# Patient Record
Sex: MALE | Race: WHITE | Employment: FULL TIME | URBAN - METROPOLITAN AREA
[De-identification: names, ages, dates, MRNs, and addresses within clinical notes are randomized per-mention and may not be internally consistent; named-entity substitution may affect disease eponyms.]

---

## 2020-01-26 ENCOUNTER — APPOINTMENT (OUTPATIENT)
Dept: GENERAL RADIOLOGY | Age: 45
DRG: 201 | End: 2020-01-26
Payer: COMMERCIAL

## 2020-01-26 ENCOUNTER — HOSPITAL ENCOUNTER (INPATIENT)
Age: 45
LOS: 3 days | Discharge: HOME | DRG: 201 | End: 2020-01-29
Attending: EMERGENCY MEDICINE | Admitting: HOSPITALIST
Payer: COMMERCIAL

## 2020-01-26 PROBLEM — J93.9 PNEUMOTHORAX: Status: ACTIVE | Noted: 2020-01-26

## 2020-01-26 LAB
ANION GAP SERPL CALCULATED.3IONS-SCNC: 17 MMOL/L (ref 3–16)
BASOPHILS ABSOLUTE: 0 K/UL (ref 0–0.2)
BASOPHILS RELATIVE PERCENT: 0.2 %
BUN BLDV-MCNC: 9 MG/DL (ref 7–20)
CALCIUM SERPL-MCNC: 10.1 MG/DL (ref 8.3–10.6)
CHLORIDE BLD-SCNC: 96 MMOL/L (ref 99–110)
CO2: 25 MMOL/L (ref 21–32)
CREAT SERPL-MCNC: 0.6 MG/DL (ref 0.9–1.3)
EKG ATRIAL RATE: 97 BPM
EKG DIAGNOSIS: NORMAL
EKG P AXIS: 42 DEGREES
EKG P-R INTERVAL: 128 MS
EKG Q-T INTERVAL: 468 MS
EKG QRS DURATION: 98 MS
EKG QTC CALCULATION (BAZETT): 594 MS
EKG R AXIS: 32 DEGREES
EKG T AXIS: 59 DEGREES
EKG VENTRICULAR RATE: 97 BPM
EOSINOPHILS ABSOLUTE: 0 K/UL (ref 0–0.6)
EOSINOPHILS RELATIVE PERCENT: 0.1 %
GFR AFRICAN AMERICAN: >60
GFR NON-AFRICAN AMERICAN: >60
GLUCOSE BLD-MCNC: 105 MG/DL (ref 70–99)
HCT VFR BLD CALC: 50.4 % (ref 40.5–52.5)
HEMOGLOBIN: 16.6 G/DL (ref 13.5–17.5)
LYMPHOCYTES ABSOLUTE: 1.3 K/UL (ref 1–5.1)
LYMPHOCYTES RELATIVE PERCENT: 9.6 %
MCH RBC QN AUTO: 29.6 PG (ref 26–34)
MCHC RBC AUTO-ENTMCNC: 32.9 G/DL (ref 31–36)
MCV RBC AUTO: 89.7 FL (ref 80–100)
MONOCYTES ABSOLUTE: 0.9 K/UL (ref 0–1.3)
MONOCYTES RELATIVE PERCENT: 6.3 %
NEUTROPHILS ABSOLUTE: 11.7 K/UL (ref 1.7–7.7)
NEUTROPHILS RELATIVE PERCENT: 83.8 %
PDW BLD-RTO: 13.9 % (ref 12.4–15.4)
PLATELET # BLD: 236 K/UL (ref 135–450)
PMV BLD AUTO: 8.6 FL (ref 5–10.5)
POTASSIUM REFLEX MAGNESIUM: 4.2 MMOL/L (ref 3.5–5.1)
PRO-BNP: 77 PG/ML (ref 0–124)
RBC # BLD: 5.62 M/UL (ref 4.2–5.9)
SODIUM BLD-SCNC: 138 MMOL/L (ref 136–145)
TROPONIN: <0.01 NG/ML
WBC # BLD: 13.9 K/UL (ref 4–11)

## 2020-01-26 PROCEDURE — 6370000000 HC RX 637 (ALT 250 FOR IP): Performed by: EMERGENCY MEDICINE

## 2020-01-26 PROCEDURE — 93005 ELECTROCARDIOGRAM TRACING: CPT | Performed by: EMERGENCY MEDICINE

## 2020-01-26 PROCEDURE — 94640 AIRWAY INHALATION TREATMENT: CPT

## 2020-01-26 PROCEDURE — 99255 IP/OBS CONSLTJ NEW/EST HI 80: CPT | Performed by: THORACIC SURGERY (CARDIOTHORACIC VASCULAR SURGERY)

## 2020-01-26 PROCEDURE — 96374 THER/PROPH/DIAG INJ IV PUSH: CPT

## 2020-01-26 PROCEDURE — 2060000000 HC ICU INTERMEDIATE R&B

## 2020-01-26 PROCEDURE — 96375 TX/PRO/DX INJ NEW DRUG ADDON: CPT

## 2020-01-26 PROCEDURE — 2580000003 HC RX 258: Performed by: EMERGENCY MEDICINE

## 2020-01-26 PROCEDURE — 0W9930Z DRAINAGE OF RIGHT PLEURAL CAVITY WITH DRAINAGE DEVICE, PERCUTANEOUS APPROACH: ICD-10-PCS | Performed by: EMERGENCY MEDICINE

## 2020-01-26 PROCEDURE — 85025 COMPLETE CBC W/AUTO DIFF WBC: CPT

## 2020-01-26 PROCEDURE — 94761 N-INVAS EAR/PLS OXIMETRY MLT: CPT

## 2020-01-26 PROCEDURE — 80048 BASIC METABOLIC PNL TOTAL CA: CPT

## 2020-01-26 PROCEDURE — 99285 EMERGENCY DEPT VISIT HI MDM: CPT

## 2020-01-26 PROCEDURE — 6360000002 HC RX W HCPCS: Performed by: EMERGENCY MEDICINE

## 2020-01-26 PROCEDURE — 96376 TX/PRO/DX INJ SAME DRUG ADON: CPT

## 2020-01-26 PROCEDURE — 2500000003 HC RX 250 WO HCPCS: Performed by: EMERGENCY MEDICINE

## 2020-01-26 PROCEDURE — 2580000003 HC RX 258: Performed by: HOSPITALIST

## 2020-01-26 PROCEDURE — 2700000000 HC OXYGEN THERAPY PER DAY

## 2020-01-26 PROCEDURE — 93010 ELECTROCARDIOGRAM REPORT: CPT | Performed by: INTERNAL MEDICINE

## 2020-01-26 PROCEDURE — 71045 X-RAY EXAM CHEST 1 VIEW: CPT

## 2020-01-26 PROCEDURE — 31500 INSERT EMERGENCY AIRWAY: CPT

## 2020-01-26 PROCEDURE — 83880 ASSAY OF NATRIURETIC PEPTIDE: CPT

## 2020-01-26 PROCEDURE — 84484 ASSAY OF TROPONIN QUANT: CPT

## 2020-01-26 RX ORDER — LORAZEPAM 2 MG/ML
1 INJECTION INTRAMUSCULAR ONCE
Status: COMPLETED | OUTPATIENT
Start: 2020-01-26 | End: 2020-01-26

## 2020-01-26 RX ORDER — SODIUM CHLORIDE 9 MG/ML
1000 INJECTION, SOLUTION INTRAVENOUS CONTINUOUS
Status: DISCONTINUED | OUTPATIENT
Start: 2020-01-26 | End: 2020-01-27

## 2020-01-26 RX ORDER — IPRATROPIUM BROMIDE AND ALBUTEROL SULFATE 2.5; .5 MG/3ML; MG/3ML
1 SOLUTION RESPIRATORY (INHALATION) ONCE
Status: COMPLETED | OUTPATIENT
Start: 2020-01-26 | End: 2020-01-26

## 2020-01-26 RX ORDER — OXYCODONE HYDROCHLORIDE AND ACETAMINOPHEN 5; 325 MG/1; MG/1
2 TABLET ORAL EVERY 4 HOURS PRN
Status: DISCONTINUED | OUTPATIENT
Start: 2020-01-26 | End: 2020-01-29 | Stop reason: HOSPADM

## 2020-01-26 RX ORDER — OXYCODONE HYDROCHLORIDE AND ACETAMINOPHEN 5; 325 MG/1; MG/1
1 TABLET ORAL EVERY 4 HOURS PRN
Status: DISCONTINUED | OUTPATIENT
Start: 2020-01-26 | End: 2020-01-29 | Stop reason: HOSPADM

## 2020-01-26 RX ORDER — LIDOCAINE HYDROCHLORIDE AND EPINEPHRINE 10; 10 MG/ML; UG/ML
20 INJECTION, SOLUTION INFILTRATION; PERINEURAL ONCE
Status: COMPLETED | OUTPATIENT
Start: 2020-01-26 | End: 2020-01-26

## 2020-01-26 RX ORDER — SODIUM CHLORIDE 9 MG/ML
1000 INJECTION, SOLUTION INTRAVENOUS CONTINUOUS
Status: DISCONTINUED | OUTPATIENT
Start: 2020-01-26 | End: 2020-01-26

## 2020-01-26 RX ADMIN — IPRATROPIUM BROMIDE AND ALBUTEROL SULFATE 1 AMPULE: .5; 3 SOLUTION RESPIRATORY (INHALATION) at 13:29

## 2020-01-26 RX ADMIN — LORAZEPAM 1 MG: 2 INJECTION INTRAMUSCULAR; INTRAVENOUS at 15:05

## 2020-01-26 RX ADMIN — HYDROMORPHONE HYDROCHLORIDE 1 MG: 1 INJECTION, SOLUTION INTRAMUSCULAR; INTRAVENOUS; SUBCUTANEOUS at 14:20

## 2020-01-26 RX ADMIN — IPRATROPIUM BROMIDE AND ALBUTEROL SULFATE 1 AMPULE: .5; 3 SOLUTION RESPIRATORY (INHALATION) at 16:08

## 2020-01-26 RX ADMIN — HYDROMORPHONE HYDROCHLORIDE 1 MG: 1 INJECTION, SOLUTION INTRAMUSCULAR; INTRAVENOUS; SUBCUTANEOUS at 15:10

## 2020-01-26 RX ADMIN — LIDOCAINE HYDROCHLORIDE AND EPINEPHRINE 20 ML: 10; 10 INJECTION, SOLUTION INFILTRATION; PERINEURAL at 15:15

## 2020-01-26 RX ADMIN — SODIUM CHLORIDE 1000 ML: 9 INJECTION, SOLUTION INTRAVENOUS at 13:45

## 2020-01-26 RX ADMIN — SODIUM CHLORIDE 1000 ML: 9 INJECTION, SOLUTION INTRAVENOUS at 18:53

## 2020-01-26 ASSESSMENT — PAIN DESCRIPTION - LOCATION
LOCATION: CHEST
LOCATION: CHEST

## 2020-01-26 ASSESSMENT — ENCOUNTER SYMPTOMS
ABDOMINAL PAIN: 0
VOMITING: 0
COUGH: 1
SHORTNESS OF BREATH: 1
NAUSEA: 0
CHEST TIGHTNESS: 0

## 2020-01-26 ASSESSMENT — PAIN DESCRIPTION - PROGRESSION
CLINICAL_PROGRESSION: GRADUALLY IMPROVING
CLINICAL_PROGRESSION: GRADUALLY WORSENING
CLINICAL_PROGRESSION: GRADUALLY IMPROVING

## 2020-01-26 ASSESSMENT — PAIN DESCRIPTION - FREQUENCY
FREQUENCY: CONTINUOUS
FREQUENCY: CONTINUOUS

## 2020-01-26 ASSESSMENT — PAIN DESCRIPTION - PAIN TYPE
TYPE: ACUTE PAIN
TYPE: ACUTE PAIN

## 2020-01-26 ASSESSMENT — PAIN SCALES - GENERAL
PAINLEVEL_OUTOF10: 10
PAINLEVEL_OUTOF10: 4
PAINLEVEL_OUTOF10: 10
PAINLEVEL_OUTOF10: 0
PAINLEVEL_OUTOF10: 10
PAINLEVEL_OUTOF10: 10

## 2020-01-26 ASSESSMENT — PAIN DESCRIPTION - DESCRIPTORS: DESCRIPTORS: DULL

## 2020-01-26 ASSESSMENT — PAIN DESCRIPTION - ORIENTATION
ORIENTATION: RIGHT
ORIENTATION: RIGHT

## 2020-01-26 ASSESSMENT — PAIN - FUNCTIONAL ASSESSMENT
PAIN_FUNCTIONAL_ASSESSMENT: PREVENTS OR INTERFERES SOME ACTIVE ACTIVITIES AND ADLS
PAIN_FUNCTIONAL_ASSESSMENT: PREVENTS OR INTERFERES WITH MANY ACTIVE NOT PASSIVE ACTIVITIES

## 2020-01-26 ASSESSMENT — PAIN DESCRIPTION - ONSET: ONSET: ON-GOING

## 2020-01-26 NOTE — ED PROVIDER NOTES
Tenderness: There is no abdominal tenderness. Hernia: No hernia is present. Skin:     General: Skin is warm and dry. Neurological:      Mental Status: He is alert and oriented to person, place, and time. Psychiatric:         Behavior: Behavior normal. Behavior is cooperative. Thought Content: Thought content normal.         DIAGNOSTIC RESULTS   LABS:    Labs Reviewed   CBC WITH AUTO DIFFERENTIAL - Abnormal; Notable for the following components:       Result Value    WBC 13.9 (*)     Neutrophils Absolute 11.7 (*)     All other components within normal limits    Narrative:     Performed at:  [facility]  [address] [place], [place] [address]   Phone [phone]   BASIC METABOLIC PANEL W/ REFLEX TO MG FOR LOW K - Abnormal; Notable for the following components:    Chloride 96 (*)     Anion Gap 17 (*)     Glucose 105 (*)     CREATININE 0.6 (*)     All other components within normal limits    Narrative:     Performed at:  [address] [address]   Phone [phone]   BRAIN NATRIURETIC PEPTIDE    Narrative:     Performed at:  [address] [address]   Phone [phone]   TROPONIN    Narrative:     Performed at:  [facility]  [facility] [place] [address] [place], [place] [address]   Phone [phone]       All other labs were within normal range or not returned as of this dictation. EKG: All EKG's are interpreted by the Emergency Department Physician in the absence of a cardiologist.  Please see their note for interpretation of EKG.       RADIOLOGY:   Non-plain film images such as CT, Ultrasound and MRI are read by the radiologist. Plain radiographic images are visualized and preliminarily interpreted by the  ED Provider with the below findings:    Interpretation per the Radiologist below, if available at the time of this note:    XR CHEST PORTABLE   Final Result   Large acute spontaneous right-sided pneumothorax. No finding of tension. No results found. PROCEDURES   Unless otherwise noted below, none     Procedures    CRITICAL CARE TIME   N/A    CONSULTS:  IP CONSULT TO [address] [place] [address] COURSE and DIFFERENTIAL DIAGNOSIS/MDM:   Vitals:    Vitals:    01/26/20 1225 01/26/20 1332 01/26/20 1346 01/26/20 1401   BP: (!) 141/88      Pulse: 83      Resp: 21 20 20    Temp: 98.4 °F (36.9 °C)      TempSrc: Oral      SpO2: 93% 94% 95% 96%   Weight: 207 lb 7.3 oz (94.1 kg)      Height: 6' 1\" (1.854 m)          Patient was given thefollowing medications:  Medications   0.9 % sodium chloride infusion (has no administration in time range)   lidocaine-EPINEPHrine 1 percent-1:[id number] injection 20 mL (has no administration in time range)   LORazepam (ATIVAN) injection 1 mg (has no administration in time range)   HYDROmorphone (DILAUDID) injection 1 mg (has no administration in time range)   ipratropium-albuterol (DUONEB) nebulizer solution 1 ampule (1 ampule Inhalation Given 1/26/20 1329)       ED COURSE & MEDICAL DECISION MAKING    Pertinent Labs & Imaging studies reviewed. (See chart for details)   -  Patient seen and evaluated in the emergency department. -  Triage and nursing notes reviewed and incorporated. -  Old chart records reviewed and incorporated. -  Patient case discussed with attending physician, Dr Shamir Denson. They saw and examined patient. -  Differential diagnosis includes: acute coronary syndrome, pulmonary embolism, COPD/asthma, pneumonia, sepsis, pericardial tamponade, pneumothorax, CHF, thoracic aortic dissection, anxiety  -  Work-up included:  See above  -  ED treatment included:  Chest tube placement by Dr Lima Mccabe  - Consults: Cardiothoracic surgery  -  Results discussed with patient.   Labs show leukocytosis of 13.9, no concerning anemia, there are no

## 2020-01-27 PROCEDURE — 2060000000 HC ICU INTERMEDIATE R&B

## 2020-01-27 PROCEDURE — 6370000000 HC RX 637 (ALT 250 FOR IP): Performed by: NURSE PRACTITIONER

## 2020-01-27 RX ADMIN — OXYCODONE HYDROCHLORIDE AND ACETAMINOPHEN 2 TABLET: 5; 325 TABLET ORAL at 06:06

## 2020-01-27 RX ADMIN — OXYCODONE HYDROCHLORIDE AND ACETAMINOPHEN 2 TABLET: 5; 325 TABLET ORAL at 20:51

## 2020-01-27 RX ADMIN — OXYCODONE HYDROCHLORIDE AND ACETAMINOPHEN 2 TABLET: 5; 325 TABLET ORAL at 15:03

## 2020-01-27 RX ADMIN — OXYCODONE HYDROCHLORIDE AND ACETAMINOPHEN 2 TABLET: 5; 325 TABLET ORAL at 01:31

## 2020-01-27 RX ADMIN — OXYCODONE HYDROCHLORIDE AND ACETAMINOPHEN 2 TABLET: 5; 325 TABLET ORAL at 09:52

## 2020-01-27 ASSESSMENT — PAIN DESCRIPTION - PROGRESSION
CLINICAL_PROGRESSION: GRADUALLY WORSENING
CLINICAL_PROGRESSION: NOT CHANGED
CLINICAL_PROGRESSION: GRADUALLY WORSENING

## 2020-01-27 ASSESSMENT — PAIN DESCRIPTION - FREQUENCY
FREQUENCY: CONTINUOUS
FREQUENCY: CONTINUOUS
FREQUENCY: INTERMITTENT
FREQUENCY: CONTINUOUS
FREQUENCY: CONTINUOUS
FREQUENCY: INTERMITTENT

## 2020-01-27 ASSESSMENT — PAIN SCALES - GENERAL
PAINLEVEL_OUTOF10: 0
PAINLEVEL_OUTOF10: 3
PAINLEVEL_OUTOF10: 10
PAINLEVEL_OUTOF10: 7
PAINLEVEL_OUTOF10: 8
PAINLEVEL_OUTOF10: 7
PAINLEVEL_OUTOF10: 7
PAINLEVEL_OUTOF10: 3
PAINLEVEL_OUTOF10: 4

## 2020-01-27 ASSESSMENT — PAIN DESCRIPTION - PAIN TYPE
TYPE: ACUTE PAIN

## 2020-01-27 ASSESSMENT — PAIN DESCRIPTION - ORIENTATION
ORIENTATION: RIGHT

## 2020-01-27 ASSESSMENT — PAIN DESCRIPTION - ONSET
ONSET: ON-GOING
ONSET: AWAKENED FROM SLEEP
ONSET: ON-GOING
ONSET: ON-GOING
ONSET: AWAKENED FROM SLEEP
ONSET: ON-GOING

## 2020-01-27 ASSESSMENT — PAIN DESCRIPTION - DESCRIPTORS
DESCRIPTORS: DULL
DESCRIPTORS: DISCOMFORT
DESCRIPTORS: DULL

## 2020-01-27 ASSESSMENT — PAIN DESCRIPTION - LOCATION
LOCATION: CHEST

## 2020-01-27 ASSESSMENT — PAIN - FUNCTIONAL ASSESSMENT
PAIN_FUNCTIONAL_ASSESSMENT: PREVENTS OR INTERFERES SOME ACTIVE ACTIVITIES AND ADLS
PAIN_FUNCTIONAL_ASSESSMENT: ACTIVITIES ARE NOT PREVENTED
PAIN_FUNCTIONAL_ASSESSMENT: ACTIVITIES ARE NOT PREVENTED
PAIN_FUNCTIONAL_ASSESSMENT: PREVENTS OR INTERFERES SOME ACTIVE ACTIVITIES AND ADLS
PAIN_FUNCTIONAL_ASSESSMENT: ACTIVITIES ARE NOT PREVENTED
PAIN_FUNCTIONAL_ASSESSMENT: PREVENTS OR INTERFERES SOME ACTIVE ACTIVITIES AND ADLS

## 2020-01-27 NOTE — CONSULTS
Consultation H&P    Date of Admission:  1/26/2020 12:20 PM  Date of Consultation:  1/27/2020    PCP:  Jesus Ignacio MD        Chief Complaint: ptx    History of Present Illness: We are asked to see this patient in consultation by Dr. Lima Mccabe regarding ptx. Alexis Russo is a 40 y.o. male smoker who presented to urgent care 1/26/20 - dyspnea, began 3-4 days ago with cough. Worse with exertion. R ptx noted. Pt sent to [facility] THE ED. Chest tube placed. To suction. Admitted. Past Medical History:  History reviewed. No pertinent past medical history. Past Surgical History:  History reviewed. No pertinent surgical history. Home Medications:   Prior to Admission medications    Not on File        Facility Administered Medications: Allergies:  No Known Allergies     Social History:    Working: behavioral health    Social History     Socioeconomic History    Marital status:      Spouse name: Not on file    Number of children: Not on file    Years of education: Not on file    Highest education level: Not on file   Occupational History    Not on file   Social Needs    Financial resource strain: Not on file    Food insecurity:     Worry: Not on file     Inability: Not on file    Transportation needs:     Medical: Not on file     Non-medical: Not on file   Tobacco Use    Smoking status: Former Smoker    Smokeless tobacco: Former User    Tobacco comment: started late teens   Substance and Sexual Activity    Alcohol use:  Yes    Drug use: Not Currently     Types: Opiates      Comment: past     Sexual activity: Never   Lifestyle    Physical activity:     Days per week: Not on file     Minutes per session: Not on file    Stress: Not on file   Relationships    Social connections:     Talks on phone: Not on file     Gets together: Not on file     Attends Jew service: Not on file     Active member of club or organization: Not on file     Attends meetings of clubs or organizations: Not on file     Relationship status: Not on file    Intimate partner violence:     Fear of current or ex partner: Not on file     Emotionally abused: Not on file     Physically abused: Not on file     Forced sexual activity: Not on file   Other Topics Concern    Not on file   Social History Narrative    Not on file       Family History:      Problem Relation Age of Onset    Cancer Father         pancreatic       Review of Systems:  Reviewed, negative unless noted  Constitutional: weight change, weakness, impairment of ADLs  Eyes: eyestrain, redness, discharges  ENMT: post nasal drip, sinus pain, discharge   Cardiovascular: faintness, vertigo, color changes in fingers/toes  Respiratory: cough, sputum, hemoptysis  GI: excessive thirst, changes in bowel habits, abdominal swelling  : painful urination, pyuria, incontinence  Musculoskeletal: cramps, swelling, limitation of motor activity  Integumentary: cyanosis, changes in skin, dryness  Neurological: paralysis, tingling, tremors  Psychiatric: restlessness, irritability, mood swings  Endocrine: heat/cold intolerance, excessive sweating, hair loss  Hematologic/lymphatic: swollen glands, anemia, easy bruising/bleeding      Physical Examination:    /71   Pulse 71   Temp 97.9 °F (36.6 °C) (Oral)   Resp 18   Ht 6' 1\" (1.854 m)   Wt 205 lb 7.5 oz (93.2 kg)   SpO2 97%   BMI 27.11 kg/m²      Admission Weight: 207 lb 7.3 oz (94.1 kg)     General appearance: NAD, well nourished  Eyes: anicteric  ENMT: no scars or lesions, no nasal deformity, normal dentition, no cyanosis of oral mucosa  Neck: no masses, no thyroid enlargement, no JVD. Respiratory: effort is unlabored, symmetric, no crackles, wheezes or rubs. No palpable/percussable abnormalities. Cardiovascular: regular, no murmur. PMI normal, no thrill. No  edema or varicosities.  Abdominal aorta cannot be appreciated   Pulses:    carotid brachial radial femoral popliteal DP PT   RIGHT   2+ LEFT   2+       GI: abdomen soft, nondistended, no organomegaly. Lymphatic: no cervical/supraclavicular adenopathy  Musculoskeletal: strength and tone normal. Full ROM. No scoliosis. Extremities: warm and pink. No clubbing or petechiae. Skin: no dermatitis or ulceration. No nodularity or induration. Neuro: CN grossly intact. Sensation and motor function grossly intact. Psychiatric: oriented, appropriate mood/affect. MEDICAL DECISION MAKING/TESTING  Studies personally reviewed. CXR: 1/26/20  Heart size is normal.  The left lung is clear.  A large spontaneous   pneumothorax is present on the right.  Some tethering of the upper right lung   to the pleural surface is present.  Moderate atelectasis of the lower right   lung present.  No leftward displacement of the mediastinum.  No evident rib   fracture. Heart size is normal.  A small right-sided chest tube is been placed   inferiorly in the right chest with complete or near complete re-expansion at   of the right lung.  No definite pleural edge identified.  Mild-to-moderate   atelectasis present medially in the right lung base.  Lungs otherwise clear. Labs:   CBC:   Recent Labs     01/26/20  1345   WBC 13.9*   HGB 16.6   HCT 50.4   MCV 89.7        BMP:   Recent Labs     01/26/20  1345      K 4.2   CL 96*   CO2 25   BUN 9   CREATININE 0.6*   CALCIUM 10.1     Cardiac Enzymes:   Recent Labs     01/26/20  1345   TROPONINI <0.01     PT/INR: No results for input(s): PROTIME, INR in the last 72 hours. APTT: No results for input(s): APTT in the last 72 hours.   Liver Profile:  No results found for: AST, ALT, BILIDIR, BILITOT, ALKPHOS, LABALBU  No results found for: CHOL, HDL, TRIG  HgbA1c:  No results found for: LABA1C  UA: No results found for: NITRITE, COLORU, PHUR, LABCAST, WBCUA, RBCUA, MUCUS, TRICHOMONAS, YEAST, BACTERIA, CLARITYU, SPECGRAV, LEUKOCYTESUR, UROBILINOGEN, BILIRUBINUR, BLOODU, GLUCOSEU, AMORPHOUS    History obtained:

## 2020-01-27 NOTE — PLAN OF CARE
Problem: Pain:  Goal: Pain level will decrease  Description  Pain level will decrease  1/27/2020 1546 by Amol Sandhu RN  Outcome: Ongoing  Note:   Pt has had complaints of pain. He does have pain medication available. Problem: Pain:  Goal: Control of acute pain  Description  Control of acute pain  1/27/2020 1546 by Amol Sandhu RN  Outcome: Ongoing  Note:   Pain/discomfort being managed with PRN analgesics per MD orders. Pt able to express presence and absence of pain and rate pain appropriately using numerical scale. Problem: Pain:  Goal: Control of chronic pain  Description  Control of chronic pain  1/27/2020 1546 by Amol Sandhu RN  Outcome: Ongoing  Note:   Pain is acute not chronic. Problem: Breathing Pattern - Ineffective:  Goal: Ability to achieve and maintain a regular respiratory rate will improve  Description  Ability to achieve and maintain a regular respiratory rate will improve  1/27/2020 1546 by Amol Sandhu RN  Outcome: Ongoing  Note:   Respirations easy even no distress. Lung sounds are diminished throughout. Chest tube remains in place draining yellow fluid.

## 2020-01-28 ENCOUNTER — APPOINTMENT (OUTPATIENT)
Dept: CT IMAGING | Age: 45
DRG: 201 | End: 2020-01-28
Payer: COMMERCIAL

## 2020-01-28 PROCEDURE — 6370000000 HC RX 637 (ALT 250 FOR IP): Performed by: NURSE PRACTITIONER

## 2020-01-28 PROCEDURE — 6360000002 HC RX W HCPCS: Performed by: INTERNAL MEDICINE

## 2020-01-28 PROCEDURE — 99232 SBSQ HOSP IP/OBS MODERATE 35: CPT | Performed by: THORACIC SURGERY (CARDIOTHORACIC VASCULAR SURGERY)

## 2020-01-28 PROCEDURE — 71250 CT THORAX DX C-: CPT

## 2020-01-28 PROCEDURE — 82103 ALPHA-1-ANTITRYPSIN TOTAL: CPT

## 2020-01-28 PROCEDURE — 2060000000 HC ICU INTERMEDIATE R&B

## 2020-01-28 RX ORDER — NICOTINE 21 MG/24HR
1 PATCH, TRANSDERMAL 24 HOURS TRANSDERMAL DAILY
Status: DISCONTINUED | OUTPATIENT
Start: 2020-01-28 | End: 2020-01-29 | Stop reason: HOSPADM

## 2020-01-28 RX ORDER — KETOROLAC TROMETHAMINE 30 MG/ML
30 INJECTION, SOLUTION INTRAMUSCULAR; INTRAVENOUS EVERY 6 HOURS PRN
Status: DISCONTINUED | OUTPATIENT
Start: 2020-01-28 | End: 2020-01-29 | Stop reason: HOSPADM

## 2020-01-28 RX ADMIN — OXYCODONE HYDROCHLORIDE AND ACETAMINOPHEN 2 TABLET: 5; 325 TABLET ORAL at 04:32

## 2020-01-28 RX ADMIN — OXYCODONE HYDROCHLORIDE AND ACETAMINOPHEN 2 TABLET: 5; 325 TABLET ORAL at 15:13

## 2020-01-28 RX ADMIN — OXYCODONE HYDROCHLORIDE AND ACETAMINOPHEN 2 TABLET: 5; 325 TABLET ORAL at 08:43

## 2020-01-28 RX ADMIN — KETOROLAC TROMETHAMINE 30 MG: 30 INJECTION, SOLUTION INTRAMUSCULAR at 10:08

## 2020-01-28 RX ADMIN — OXYCODONE HYDROCHLORIDE AND ACETAMINOPHEN 2 TABLET: 5; 325 TABLET ORAL at 20:33

## 2020-01-28 ASSESSMENT — PAIN DESCRIPTION - PROGRESSION
CLINICAL_PROGRESSION: NOT CHANGED
CLINICAL_PROGRESSION: GRADUALLY WORSENING
CLINICAL_PROGRESSION: NOT CHANGED

## 2020-01-28 ASSESSMENT — PAIN DESCRIPTION - ONSET
ONSET: ON-GOING

## 2020-01-28 ASSESSMENT — PAIN - FUNCTIONAL ASSESSMENT
PAIN_FUNCTIONAL_ASSESSMENT: PREVENTS OR INTERFERES SOME ACTIVE ACTIVITIES AND ADLS
PAIN_FUNCTIONAL_ASSESSMENT: ACTIVITIES ARE NOT PREVENTED
PAIN_FUNCTIONAL_ASSESSMENT: PREVENTS OR INTERFERES SOME ACTIVE ACTIVITIES AND ADLS

## 2020-01-28 ASSESSMENT — PAIN DESCRIPTION - PAIN TYPE
TYPE: ACUTE PAIN

## 2020-01-28 ASSESSMENT — PAIN DESCRIPTION - DESCRIPTORS
DESCRIPTORS: DISCOMFORT

## 2020-01-28 ASSESSMENT — PAIN DESCRIPTION - ORIENTATION
ORIENTATION: RIGHT

## 2020-01-28 ASSESSMENT — PAIN SCALES - GENERAL
PAINLEVEL_OUTOF10: 4
PAINLEVEL_OUTOF10: 4
PAINLEVEL_OUTOF10: 7
PAINLEVEL_OUTOF10: 8
PAINLEVEL_OUTOF10: 10
PAINLEVEL_OUTOF10: 7
PAINLEVEL_OUTOF10: 0
PAINLEVEL_OUTOF10: 7
PAINLEVEL_OUTOF10: 3
PAINLEVEL_OUTOF10: 0
PAINLEVEL_OUTOF10: 6

## 2020-01-28 ASSESSMENT — PAIN DESCRIPTION - LOCATION
LOCATION: CHEST

## 2020-01-28 ASSESSMENT — PAIN DESCRIPTION - FREQUENCY
FREQUENCY: CONTINUOUS
FREQUENCY: INTERMITTENT
FREQUENCY: INTERMITTENT
FREQUENCY: CONTINUOUS
FREQUENCY: INTERMITTENT

## 2020-01-28 NOTE — PLAN OF CARE
Problem: Pain:  Goal: Pain level will decrease  Description  Pain level will decrease  1/28/2020 0052 by Axel Blandon RN  Outcome: Ongoing  1/27/2020 1546 by Cortez Lincoln RN  Outcome: Ongoing  Note:   Pt has had complaints of pain. He does have pain medication available. Goal: Control of acute pain  Description  Control of acute pain  1/28/2020 0052 by Axel Blandon RN  Outcome: Ongoing  1/27/2020 1546 by Cortez Lincoln RN  Outcome: Ongoing  Note:   Pain/discomfort being managed with PRN analgesics per MD orders. Pt able to express presence and absence of pain and rate pain appropriately using numerical scale. Goal: Control of chronic pain  Description  Control of chronic pain  1/28/2020 0052 by Axel Blandon RN  Outcome: Ongoing  1/27/2020 1546 by Cortez Lincoln RN  Outcome: Ongoing  Note:   Pain is acute not chronic. Problem: Breathing Pattern - Ineffective:  Goal: Ability to achieve and maintain a regular respiratory rate will improve  Description  Ability to achieve and maintain a regular respiratory rate will improve  1/28/2020 0052 by Axel Blandon RN  Outcome: Ongoing  1/27/2020 1546 by Cortez Lincoln RN  Outcome: Ongoing  Note:   Respirations easy even no distress. Lung sounds are diminished throughout. Chest tube remains in place draining yellow fluid.

## 2020-01-29 ENCOUNTER — APPOINTMENT (OUTPATIENT)
Dept: GENERAL RADIOLOGY | Age: 45
DRG: 201 | End: 2020-01-29
Payer: COMMERCIAL

## 2020-01-29 VITALS
HEART RATE: 90 BPM | HEIGHT: 73 IN | OXYGEN SATURATION: 95 % | RESPIRATION RATE: 17 BRPM | TEMPERATURE: 98.1 F | BODY MASS INDEX: 27.29 KG/M2 | SYSTOLIC BLOOD PRESSURE: 139 MMHG | DIASTOLIC BLOOD PRESSURE: 78 MMHG | WEIGHT: 205.91 LBS

## 2020-01-29 PROCEDURE — 6370000000 HC RX 637 (ALT 250 FOR IP): Performed by: NURSE PRACTITIONER

## 2020-01-29 PROCEDURE — 82103 ALPHA-1-ANTITRYPSIN TOTAL: CPT

## 2020-01-29 PROCEDURE — 99231 SBSQ HOSP IP/OBS SF/LOW 25: CPT | Performed by: THORACIC SURGERY (CARDIOTHORACIC VASCULAR SURGERY)

## 2020-01-29 PROCEDURE — 82104 ALPHA-1-ANTITRYPSIN PHENO: CPT

## 2020-01-29 PROCEDURE — 6360000002 HC RX W HCPCS: Performed by: INTERNAL MEDICINE

## 2020-01-29 PROCEDURE — 36415 COLL VENOUS BLD VENIPUNCTURE: CPT

## 2020-01-29 PROCEDURE — 99254 IP/OBS CNSLTJ NEW/EST MOD 60: CPT | Performed by: INTERNAL MEDICINE

## 2020-01-29 PROCEDURE — 71045 X-RAY EXAM CHEST 1 VIEW: CPT

## 2020-01-29 RX ORDER — NICOTINE 21 MG/24HR
1 PATCH, TRANSDERMAL 24 HOURS TRANSDERMAL DAILY
Qty: 30 PATCH | Refills: 0 | Status: SHIPPED | OUTPATIENT
Start: 2020-01-30 | End: 2020-01-29 | Stop reason: HOSPADM

## 2020-01-29 RX ORDER — OXYCODONE HYDROCHLORIDE AND ACETAMINOPHEN 5; 325 MG/1; MG/1
1 TABLET ORAL EVERY 6 HOURS PRN
Qty: 20 TABLET | Refills: 0 | Status: SHIPPED | OUTPATIENT
Start: 2020-01-29 | End: 2020-02-03

## 2020-01-29 RX ORDER — ALBUTEROL SULFATE 90 UG/1
2 AEROSOL, METERED RESPIRATORY (INHALATION) EVERY 6 HOURS PRN
Status: DISCONTINUED | OUTPATIENT
Start: 2020-01-29 | End: 2020-01-29 | Stop reason: HOSPADM

## 2020-01-29 RX ORDER — ALBUTEROL SULFATE 90 UG/1
2 AEROSOL, METERED RESPIRATORY (INHALATION) EVERY 6 HOURS PRN
Qty: 1 INHALER | Refills: 3 | Status: SHIPPED | OUTPATIENT
Start: 2020-01-29 | End: 2021-08-23 | Stop reason: ALTCHOICE

## 2020-01-29 RX ADMIN — OXYCODONE HYDROCHLORIDE AND ACETAMINOPHEN 2 TABLET: 5; 325 TABLET ORAL at 01:14

## 2020-01-29 RX ADMIN — OXYCODONE HYDROCHLORIDE AND ACETAMINOPHEN 2 TABLET: 5; 325 TABLET ORAL at 05:51

## 2020-01-29 RX ADMIN — KETOROLAC TROMETHAMINE 30 MG: 30 INJECTION, SOLUTION INTRAMUSCULAR at 09:22

## 2020-01-29 RX ADMIN — OXYCODONE HYDROCHLORIDE AND ACETAMINOPHEN 2 TABLET: 5; 325 TABLET ORAL at 10:03

## 2020-01-29 ASSESSMENT — PAIN DESCRIPTION - ONSET
ONSET: ON-GOING

## 2020-01-29 ASSESSMENT — PAIN DESCRIPTION - PROGRESSION
CLINICAL_PROGRESSION: GRADUALLY WORSENING

## 2020-01-29 ASSESSMENT — PAIN DESCRIPTION - PAIN TYPE
TYPE: ACUTE PAIN

## 2020-01-29 ASSESSMENT — PAIN DESCRIPTION - DESCRIPTORS
DESCRIPTORS: DISCOMFORT

## 2020-01-29 ASSESSMENT — PAIN DESCRIPTION - ORIENTATION
ORIENTATION: RIGHT

## 2020-01-29 ASSESSMENT — PAIN SCALES - GENERAL
PAINLEVEL_OUTOF10: 2
PAINLEVEL_OUTOF10: 7
PAINLEVEL_OUTOF10: 4
PAINLEVEL_OUTOF10: 4
PAINLEVEL_OUTOF10: 8
PAINLEVEL_OUTOF10: 6
PAINLEVEL_OUTOF10: 4
PAINLEVEL_OUTOF10: 5

## 2020-01-29 ASSESSMENT — PAIN DESCRIPTION - LOCATION
LOCATION: CHEST

## 2020-01-29 ASSESSMENT — PAIN DESCRIPTION - FREQUENCY
FREQUENCY: CONTINUOUS

## 2020-01-29 ASSESSMENT — PAIN - FUNCTIONAL ASSESSMENT
PAIN_FUNCTIONAL_ASSESSMENT: ACTIVITIES ARE NOT PREVENTED

## 2020-01-29 NOTE — CONSULTS
01/1995    Smokeless tobacco: Former User    Tobacco comment: started late teens   Substance and Sexual Activity    Alcohol use: Yes    Drug use: Not Currently     Types: Opiates      Comment: past     Sexual activity: Never   Lifestyle    Physical activity:     Days per week: Not on file     Minutes per session: Not on file    Stress: Not on file   Relationships    Social connections:     Talks on phone: Not on file     Gets together: Not on file     Attends Buddhism service: Not on file     Active member of club or organization: Not on file     Attends meetings of clubs or organizations: Not on file     Relationship status: Not on file    Intimate partner violence:     Fear of current or ex partner: Not on file     Emotionally abused: Not on file     Physically abused: Not on file     Forced sexual activity: Not on file   Other Topics Concern    Not on file   Social History Narrative    Not on file   Smokes 1 pack/day. Has been smoking for 20 years  Works as a      reports that he has quit smoking. He started smoking about 25 years ago. He has a 24.00 pack-year smoking history. He has quit using smokeless tobacco.    REVIEW OF SYSTEMS:  Constitutional: Negative for fever  HENT: Negative for sore throat  Eyes: Negative for redness   Respiratory: + for dyspnea, +cough  Cardiovascular: Negative for chest pain  Gastrointestinal: Negative for vomiting, diarrhea   Genitourinary: Negative for hematuria   Musculoskeletal: Negative for arthralgias   Skin: Negative for rash  Neurological: Negative for syncope  Hematological: Negative for adenopathy  Psychiatric/Behavorial: Negative for anxiety    Objective:   PHYSICAL EXAM:  Blood pressure 127/78, pulse 66, temperature 97.6 °F (36.4 °C), temperature source Oral, resp. rate 17, height 6' 1\" (1.854 m), weight 205 lb 14.6 oz (93.4 kg), SpO2 95 %. on RA    Gen: Well developed; well nourished  Eyes: No scleral icterus. No conjunctival injection.    ENT: outpatient PFTs    Lung nodules  -He has small bilateral lung nodules for which he will need a repeat chest CT in 1 year. Discussed with patient    Tobacco use  -We discussed the critical importance of smoking cessation and being away from secondhand smoke in order to prevent progression of his lung disease    Thank you for allowing me to participate in the care of this patient. Will sign off. Please call with any questions or concerns. He should come for pulmonary follow-up 2/13/2020 at 3:20 PM with repeat chest x-ray.     Fred Colon MD  [facility]

## 2020-01-29 NOTE — DISCHARGE SUMMARY
Physician Discharge Summary     Patient ID:  Flori Sandoval  [id number]  29 y.o.  1975    Admit date: 1/26/2020    Discharge date and time: No discharge date for patient encounter. Admitting Physician: Jori Brock MD     Discharge Physician: Rajinder Casas MD    Admission Diagnoses: Pneumothorax [J93.9]  Pneumothorax [J93.9]    Discharge Diagnoses: Pneumothorax     Admission Condition: fair    Discharged Condition: good    Indication for Admission: Pneumothorax     Hospital Course:   40y.o. year old male admitted on 1/26/20 for pneumothorax. He reported exertional shortness of breath for about a week. He was found to have a right-sided pneumothorax. Seen by CT surgery and Pulmonary. He underwent chest tube placement. Chest tube was subsequently removed. No air leak and no crepitus noted. Given percocet and albuterol prn. Patient instructed to leave dressing on X 24 hours. Dc'd home in stable condition. Pt will need outp PFTs and Pulm follow up. Referral given.      Consults: pulmonary/intensive care, CT surgery     Discharge Exam:  /78   Pulse 90   Temp 98.1 °F (36.7 °C) (Oral)   Resp 17   Ht 6' 1\" (1.854 m)   Wt 205 lb 14.6 oz (93.4 kg)   SpO2 95%   BMI 27.17 kg/m²     General Appearance:    Alert, cooperative, no distress, appears stated age   Head:    Normocephalic, without obvious abnormality, atraumatic   Eyes:    PERRL, conjunctiva/corneas clear, EOM's intact, fundi     benign, both eyes        Ears:    Normal TM's and external ear canals, both ears   Nose:   Nares normal, septum midline, mucosa normal, no drainage    or sinus tenderness   Throat:   Lips, mucosa, and tongue normal; teeth and gums normal   Neck:   Supple, symmetrical, trachea midline, no adenopathy;        thyroid:  No enlargement/tenderness/nodules; no carotid    bruit or JVD   Back:     Symmetric, no curvature, ROM normal, no CVA tenderness   Lungs:     Clear to auscultation bilaterally, respirations unlabored   Chest wall:    No tenderness or deformity   Heart:    Regular rate and rhythm, S1 and S2 normal, no murmur, rub   or gallop   Abdomen:     Soft, non-tender, bowel sounds active all four quadrants,     no masses, no organomegaly   Genitalia:    Normal male without lesion, discharge or tenderness   Rectal:    Normal tone, normal prostate, no masses or tenderness;    guaiac negative stool   Extremities:   Extremities normal, atraumatic, no cyanosis or edema   Pulses:   2+ and symmetric all extremities   Skin:   Skin color, texture, turgor normal, no rashes or lesions   Lymph nodes:   Cervical, supraclavicular, and axillary nodes normal   Neurologic:   CNII-XII intact. Normal strength, sensation and reflexes       throughout       Disposition: home    In process/preliminary results:  Outstanding Order Results     Date and Time Order Name Status Description    1/29/2020 1032 Alpha-1-Antitrypsin w Phenotype In process     1/28/2020 1830 Alpha-1-Antitrypsin In process           Patient Instructions:   Current Discharge Medication List      START taking these medications    Details   oxyCODONE-acetaminophen (PERCOCET) 5-325 MG per tablet Take 1 tablet by mouth every 6 hours as needed for Pain for up to 5 days. Qty: 20 tablet, Refills: 0    Comments: Reduce doses taken as pain becomes manageable  Associated Diagnoses: Primary spontaneous pneumothorax      albuterol sulfate  (90 Base) MCG/ACT inhaler Inhale 2 puffs into the lungs every 6 hours as needed for Wheezing or Shortness of Breath  Qty: 1 Inhaler, Refills: 3           Activity: activity as tolerated. No heavy lifting, pulling for one week   Diet: regular diet  Wound Care: as directed    Follow-up with PCP, Pulm in 2 weeks.     Signed:  Marci Chavez MD  Time spent: 36 mins    1/29/2020  12:52 PM

## 2020-01-29 NOTE — PROGRESS NOTES
4 Eyes Skin Assessment     The patient is being assess for  Admission    I agree that 2 RN's have performed a thorough Head to Toe Skin Assessment on the patient. ALL assessment sites listed below have been assessed. Areas assessed by both nurses: Niru Crate  [x]   Head, Face, and Ears   [x]   Shoulders, Back, and Chest  [x]   Arms, Elbows, and Hands   [x]   Coccyx, Sacrum, and IschIum  [x]   Legs, Feet, and Heels        Does the Patient have Skin Breakdown?   No         Tevin Prevention initiated:  Yes   Wound Care Orders initiated:  NA      [facility] nurse consulted for Pressure Injury (Stage 3,4, Unstageable, DTI, NWPT, and Complex wounds), New and Established Ostomies:  NA      Nurse 1 eSignature: Electronically signed by Eusebio Gaytan RN on 1/26/20 at 6:57 PM    **SHARE this note so that the co-signing nurse is able to place an eSignature**    Nurse 2 eSignature: Electronically signed by Corby Nayak RN on 1/26/20 at 6:58 PM
Discharge orders acknowledged by RN . Discharge teaching completed with pt and family. AVS reviewed and all questions answered. New prescriptions and current medication regimen reviewed and pt understands schedule. Follow up appointments also reviewed with pt and resources given for discharge. Pt was given written prescription for pain med to be filled and understands schedule. IV removed. Telemonitor removed and returned to [facility]. All other education completed. Required core measures completed. Pt vitals WDL. Pt discharged with all belongings to private residence with family. Pt able to ambulate off of unit. No complications.    Electronically signed by Bisi Flores RN on 1/29/2020 at 1:17 PM
Hospitalist Progress Note  1/27/2020 8:57 AM    PCP: Holley Moise MD    [id number]     Date of Admission: 1/26/2020                                                                                                                     HOSPITAL COURSE    Patient demographics:  The patient  Carmen Castro is a 40 y.o. male     Significant past medical history:   Patient Active Problem List   Diagnosis    Pneumothorax         Presenting symptoms:      Diagnostic workup:      CONSULTS DURING ADMISSION :   IP CONSULT TO CARDIOTHORACIC SURGERY  IP CONSULT TO [facility]      Patient was diagnosed with:        Treatment while inpatient:                                                                                         ----------------------------------------------------------      SUBJECTIVE COMPLAINTS- follow-up for pneumothorax    Diet: DIET GENERAL;      OBJECTIVE:   Patient Active Problem List   Diagnosis    Pneumothorax       Allergies  Patient has no known allergies. Medications    Scheduled Meds:  Continuous Infusions:  PRN Meds:  oxyCODONE-acetaminophen **OR** oxyCODONE-acetaminophen    Vitals   Vitals /wt   Patient Vitals for the past 8 hrs:   BP Temp Temp src Pulse Resp SpO2 Weight   01/27/20 0817 127/72 97.2 °F (36.2 °C) Oral 84 18 94 % --   01/27/20 0435 127/71 97.9 °F (36.6 °C) Oral 71 18 97 % 205 lb 7.5 oz (93.2 kg)   01/27/20 0059 113/70 97.9 °F (36.6 °C) Oral 65 18 98 % --        72HR INTAKE/OUTPUT:      Intake/Output Summary (Last 24 hours) at 1/27/2020 0857  Last data filed at 1/27/2020 0736  Gross per 24 hour   Intake --   Output 1515 ml   Net -1515 ml       Exam:    Gen:   Alert and oriented ×3   Eyes: PERRL. No sclera icterus. No conjunctival injection. ENT: No discharge. Pharynx clear. External appearance of ears and nose normal.  Neck: Trachea midline. No obvious mass. Resp: decreased breath sounds on the right side  CV: Regular rate. Regular rhythm. No murmur or rub. No edema.
Vital signs, assessment, 4 eyes, and admission completed. Chest tube in place to -20 suction  tegaderm placed over dry dressing. Patient oriented to unit and room. Call light and bedside table within reach. All questions answered. Patient resting quietly with no further questions at this time.   Will continue to monitor  Electronically signed by Sloane Samayoa RN on 1/26/2020 at 5:05 PM
student    1/29/2020  11:15 AM     Tube was clamped overnight. No ptx on today's film. Tube removed. Pt d/eda.     Manual MD Kylah  1/29/2020  3:01 PM
resolved hospital problems. *    Patient is a 63-year-old male who presented with shortness of breath with cough. He was found to have a pneumothorax on the right side and sent to the ER. Chest tube was placed. Pneumothorax- possible chest tube removal tomorrow  -Cardiothoracic consulted. Patient status post chest tube placement  -Repeat chest x-ray shows improvement  -Toradol. Stop IV pain meds. -Cut back Percocet  - CT chest ordered to work up for blebs/ emphysema/ structural abnormality  - add john 1 antitrypsin   - pulm consult for out pt FU      Tobacco abuse  - counseled on cessation       DVT Prophylaxis:scd   Diet: DIET GENERAL;  Code Status: No Order      Discharge plan - possibly tomorrow    The patient and / or the family were informed of the results of any tests, a time was given to answer questions, a plan was proposed and they agreed with plan. Discussed with consulting physicians, nursing and social work     The note was completed using EMR. Every effort was made to ensure accuracy; however, inadvertent computerized transcription errors may be present.        Ronald Mcnamara MD

## 2020-01-29 NOTE — DISCHARGE INSTR - COC
{CHP DME VFUZ:283523305}  Transfer  {CHP DME JCKX:030243525}  Bathing  {CHP DME JLEL:024774625}  Dressing  {CHP DME WSJQ:525212644}  Toileting  {CHP DME GEBB:722376442}  Feeding  {CHP DME DXQF:242304304}  Med Admin  {CHP DME DTXD:773640045}  Med Delivery   { DAY MED Delivery:641023675}    Wound Care Documentation and Therapy:        Elimination:  Continence:   · Bowel: {YES / WX:96062}  · Bladder: {YES / YC:15767}  Urinary Catheter: {Urinary Catheter:876492131}   Colostomy/Ileostomy/Ileal Conduit: {YES / PF:39826}       Date of Last BM: ***    Intake/Output Summary (Last 24 hours) at 2020 1250  Last data filed at 2020 0901  Gross per 24 hour   Intake 720 ml   Output 30 ml   Net 690 ml     I/O last 3 completed shifts:   In: 240 [P.O.:240]  Out: 0     Safety Concerns:     [employer] Safety Concerns:212984270}    Impairments/Disabilities:      [employer] Impairments/Disabilities:181475481}    Nutrition Therapy:  Current Nutrition Therapy:   [employer] Diet List:476540173}    Routes of Feeding: {CHP DME Other Feedings:029874245}  Liquids: {Slp liquid thickness:87325}  Daily Fluid Restriction: {CHP DME Yes amt example:838973731}  Last Modified Barium Swallow with Video (Video Swallowing Test): {Done Not Done FQSU:790174146}    Treatments at the Time of Hospital Discharge:   Respiratory Treatments: ***  Oxygen Therapy:  {Therapy; copd oxygen:77276}  Ventilator:    {[facility] Vent ECOX:348912773}    Rehab Therapies: {THERAPEUTIC INTERVENTION:7613646682}  Weight Bearing Status/Restrictions: [employer] Weight Bearin}  Other Medical Equipment (for information only, NOT a DME order):  {EQUIPMENT:464841886}  Other Treatments: ***    Patient's personal belongings (please select all that are sent with patient):  {P DME Belongings:127121530}    RN SIGNATURE:  {Esignature:297416181}    CASE MANAGEMENT/SOCIAL WORK SECTION    Inpatient Status Date: ***    Readmission Risk Assessment Score:  Readmission Risk              Risk

## 2020-01-30 LAB — ALPHA-1 ANTITRYPSIN: 147 MG/DL (ref 90–200)

## 2020-02-01 LAB
ALPHA-1 ANTITRYPSIN PHENOTYPE: NORMAL
ALPHA-1 ANTITRYPSIN: 153 MG/DL (ref 90–200)

## 2020-02-13 ENCOUNTER — OFFICE VISIT (OUTPATIENT)
Dept: PULMONOLOGY | Age: 45
End: 2020-02-13
Payer: COMMERCIAL

## 2020-02-13 VITALS
OXYGEN SATURATION: 93 % | HEART RATE: 70 BPM | DIASTOLIC BLOOD PRESSURE: 72 MMHG | WEIGHT: 213 LBS | HEIGHT: 73 IN | TEMPERATURE: 97.6 F | BODY MASS INDEX: 28.23 KG/M2 | RESPIRATION RATE: 16 BRPM | SYSTOLIC BLOOD PRESSURE: 110 MMHG

## 2020-02-13 PROCEDURE — 99213 OFFICE O/P EST LOW 20 MIN: CPT | Performed by: INTERNAL MEDICINE

## 2020-02-13 NOTE — PROGRESS NOTES
Chief complaint  This is a 40y.o. year old male  who presents with a chief complaint of   Chief Complaint   Patient presents with   [address] from Hospital     HFU pneumothorax - need repeat CXR       HPI  26-year-old man with secondary spontaneous pneumothorax, bullous emphysema, lung nodules and personal history of tobacco use presents for hospital follow-up. He was admitted to [facility] in January 2020 for a right-sided spontaneous pneumothorax. He was found to have bullous emphysema. He required chest tube placement. He says he has gone back to his usual activities. He is able to climb up to his third floor office at work without being short of breath. He denies cough or sputum production. He has an albuterol inhaler, but has not had to use it. He has quit smoking cigarettes. Occupation:       Past Medical History:   Diagnosis Date    Pneumothorax on right 01/26/2020    chest tube placed in ED    Tobacco use     started at age 21       History reviewed. No pertinent surgical history. Current Outpatient Medications   Medication Sig Dispense Refill    albuterol sulfate  (90 Base) MCG/ACT inhaler Inhale 2 puffs into the lungs every 6 hours as needed for Wheezing or Shortness of Breath 1 Inhaler 3     No current facility-administered medications for this visit.         No Known Allergies    Family History   Problem Relation Age of Onset   [facility] Cancer Father         pancreatic    Other Mother         \"blood illness\"       Social History     Socioeconomic History    Marital status:      Spouse name: Not on file    Number of children: Not on file    Years of education: Not on file    Highest education level: Not on file   Occupational History    Not on file   Social Needs    Financial resource strain: Not on file    Food insecurity:     Worry: Not on file     Inability: Not on file    Transportation needs:     Medical: Not on file     Non-medical: Not on file Tobacco Use    Smoking status: Former Smoker     Packs/day: 1.00     Years: 20.00     Pack years: 20.00     Types: Cigarettes     Start date: 1995     Last attempt to quit: 2020     Years since quittin.0    Smokeless tobacco: Former User    Tobacco comment: started late teens   Substance and Sexual Activity    Alcohol use: Not Currently    Drug use: Not Currently     Types: Opiates      Comment: past     Sexual activity: Never   Lifestyle    Physical activity:     Days per week: Not on file     Minutes per session: Not on file    Stress: Not on file   Relationships    Social connections:     Talks on phone: Not on file     Gets together: Not on file     Attends Nondenominational service: Not on file     Active member of club or organization: Not on file     Attends meetings of clubs or organizations: Not on file     Relationship status: Not on file    Intimate partner violence:     Fear of current or ex partner: Not on file     Emotionally abused: Not on file     Physically abused: Not on file     Forced sexual activity: Not on file   Other Topics Concern    Not on file   Social History Narrative    Not on file       ROS:  GENERAL:  No fevers, no chills  RESPIRATORY:  No shortness of breath, no wheezing, no cough      PHYSICAL EXAM:  Vitals:    20 1500   BP: 110/72   Pulse: 70   Resp: 16   Temp: 97.6 °F (36.4 °C)   SpO2: 93%   on RA    Gen: Well developed; well nourished  Eyes: No scleral icterus. No conjunctival injection. ENT:  Oropharynx clear. External appearance of ears and nose normal.  Neck: Trachea midline. No obvious mass. No visible thyroid enlargement    Respiratory: Clear to auscultation bilaterally, no accessory muscle use  Cardiovascular: Regular rate and rhythm, no appreciable murmurs. No edema. Gastrointestinal: Soft, non-tender. No hernia  Skin: Warm and dry. No rashes or ulcers on visible areas. Normal texture and turgor  Lymphatic: No cervical LAD.  No supraclavicular

## 2020-03-06 ENCOUNTER — TELEPHONE (OUTPATIENT)
Dept: PULMONOLOGY | Age: 45
End: 2020-03-06

## 2020-03-06 NOTE — TELEPHONE ENCOUNTER
Lucille Boykin calls in. 36 hrs ago started with non productive cough associated with sharp pain in middle of spine. Dx with secondary right sided spontaneous pneumothorax January 2020. Per pt.,\" Dr. Eros Murcia wanted pt to be cautious with any cough and to let her know. Pt denies fever, wheezing or sob. Please advise.

## 2020-03-06 NOTE — TELEPHONE ENCOUNTER
The patient should probably come in for a chest x-ray just to make sure there's not another pneumothorax. Place order and I will sign. Please let patient know.  Thanks

## 2021-08-23 ENCOUNTER — OFFICE VISIT (OUTPATIENT)
Dept: PULMONOLOGY | Age: 46
End: 2021-08-23
Payer: COMMERCIAL

## 2021-08-23 VITALS
TEMPERATURE: 96.9 F | SYSTOLIC BLOOD PRESSURE: 120 MMHG | WEIGHT: 230 LBS | HEART RATE: 92 BPM | RESPIRATION RATE: 18 BRPM | BODY MASS INDEX: 30.48 KG/M2 | HEIGHT: 73 IN | DIASTOLIC BLOOD PRESSURE: 80 MMHG | OXYGEN SATURATION: 97 %

## 2021-08-23 DIAGNOSIS — J43.2 CENTRILOBULAR EMPHYSEMA (HCC): Primary | ICD-10-CM

## 2021-08-23 PROCEDURE — 99203 OFFICE O/P NEW LOW 30 MIN: CPT | Performed by: INTERNAL MEDICINE

## 2021-08-23 RX ORDER — ALBUTEROL SULFATE 90 UG/1
2 AEROSOL, METERED RESPIRATORY (INHALATION) 4 TIMES DAILY PRN
Qty: 1 INHALER | Refills: 5 | Status: SHIPPED | OUTPATIENT
Start: 2021-08-23 | End: 2022-02-15

## 2021-08-23 NOTE — PROGRESS NOTES
Pulmonary Progress note           REASON FOR CONSULTATION:  Chief Complaint   Patient presents with    Follow-up     6m prev [facility] patient     Other     no meds    Other     quit smoking last year but has picked back up        Consult at request of Bartolo Garibay MD     PCP: Bartolo Garibay MD        Assessment and Plan:  Check alpha one antitrypsin  Check PFT  Was strongly advised to quit smoking  Albuterol as needed and will add maintains inhaler based on his PFT results         HISTORY OF PRESENT ILLNESS: Candi Atkinson is very pleasant 55y.o. year old gentleman with medical history stated below significant for active tobacco use with > 30 PY history of smoking currently 1 PPD, h/o secondary spontaneous pneumothorax 1/2020 s/p chest tube drainage,  lung nodules 3 mm right upper lobe, here today for follow-up. He was admitted to [facility] in January 2020 for a right-sided spontaneous pneumothorax. He was found to have bullous emphysema. He required chest tube placement. He complains of shortness of breath with moderate exertion  He has an albuterol inhaler as needed. Past Medical History:   Diagnosis Date    Bleb, lung ([address]     Pneumothorax on right 01/26/2020    chest tube placed in ED    Tobacco use     started at age 21       History reviewed. No pertinent surgical history. family history includes Cancer in his father; Other in his mother. Has family history of pneumothorax in his brother. SOCIAL HISTORY:   reports that he has been smoking cigarettes. He started smoking about 26 years ago. He has a 20.00 pack-year smoking history. He has quit using smokeless tobacco.      ALLERGIES:  Patient has No Known Allergies. REVIEW OF SYSTEMS:  Constitutional: Negative for fever   HENT: Negative for sore throat  Eyes: Negative for redness   Respiratory: Shortness of breath with exertion.     Cardiovascular: Negative for chest pain  Gastrointestinal: Negative for vomiting, diarrhea   Genitourinary: Negative for hematuria   Musculoskeletal: Negative for arthralgias   Skin: Negative for rash  Neurological: Negative for syncope  Hematological: Negative for adenopathy  Psychiatric/Behavorial: Negative for anxiety    Objective:   PHYSICAL EXAM:  Blood pressure 120/80, pulse 92, temperature 96.9 °F (36.1 °C), resp. rate 18, height 6' 1\" (1.854 m), weight 230 lb (104.3 kg), SpO2 97 %.'  Gen: No distress. Eyes: PERRL. No sclera icterus. No conjunctival injection. ENT: No discharge. Pharynx clear. External appearance of ears and nose normal.  Neck: Trachea midline. No obvious mass. Resp: No accessory muscle use. No crackles. No wheezes. No rhonchi. CV: Regular rate. Regular rhythm. No murmur or rub. No edema. GI: Non-tender. Non-distended. No hernia. Skin: Warm, dry, normal texture and turgor. No nodule on exposed extremities. Lymph: No cervical LAD. No supraclavicular LAD. M/S: No cyanosis. No clubbing. No joint deformity. Neuro: Moves all four extremities. Psych: Oriented x 3. No anxiety. Awake. Alert. Intact judgement and insight. Current Outpatient Medications   Medication Sig Dispense Refill    albuterol sulfate HFA (VENTOLIN HFA) 108 (90 Base) MCG/ACT inhaler Inhale 2 puffs into the lungs 4 times daily as needed for Wheezing or Shortness of Breath 1 Inhaler 5     No current facility-administered medications for this visit.        Data Reviewed:   CBC and Renal reviewed  Last CBC  Lab Results   Component Value Date    WBC 13.9 01/26/2020    RBC 5.62 01/26/2020    HGB 16.6 01/26/2020    MCV 89.7 01/26/2020     01/26/2020     Last Renal  Lab Results   Component Value Date     01/26/2020    K 4.2 01/26/2020    CL 96 01/26/2020    CO2 25 01/26/2020    BUN 9 01/26/2020    CREATININE 0.6 01/26/2020    GLUCOSE 105 01/26/2020    CALCIUM 10.1 01/26/2020       Last ABG  POC Blood Gas: No results found for: POCPH, POCPCO2, POCPO2, POCHCO3, NBEA, [id number]  No results for input(s): PH, PCO2, PO2, HCO3, BE, O2SAT in the last 72 hours. Radiology Review:  Pertinent images / reports were reviewed as a part of this visit. CT Chest w/ contrast: No results found for this or any previous visit. CT Chest w/o contrast: Results for orders placed during the hospital encounter of 01/26/20    CT CHEST WO CONTRAST    Narrative  EXAMINATION:  CT OF THE CHEST WITHOUT CONTRAST 1/28/2020 1:00 pm    TECHNIQUE:  CT of the chest was performed without the administration of intravenous  contrast. Multiplanar reformatted images are provided for review. Dose  modulation, iterative reconstruction, and/or weight based adjustment of the  mA/kV was utilized to reduce the radiation dose to as low as reasonably  achievable. COMPARISON:  None. HISTORY:  ORDERING SYSTEM PROVIDED HISTORY: pneumothorax, unclear cause, for structural  issues  TECHNOLOGIST PROVIDED HISTORY:  Reason for exam:->pneumothorax, unclear cause, for structural issues  Reason for Exam: pneumothorax, unclear cause, for structural issues. no  surgery  Acuity: Acute  Type of Exam: Subsequent/Follow-up    FINDINGS:  Mediastinum: Thyroid gland is unremarkable. Tiny mediastinal nodes are  noted. Coronary artery calcification is seen. Trace pericardial fluid is  seen. Small hiatal hernia seen. There is nonspecific thickening at the GE  junction. Lungs/pleura: Large blebs-bullae seen in the left lung apex. There is a tiny  noncalcified pulmonary nodule anteriorly left lung apex measuring 2-3 mm. No  pleural effusions or airspace consolidation on the left    Small pleural drainage catheter is seen on the right. Bleb-bullae seen in  the right lung apex. Tiny punctate pulmonary nodule seen in the right lung  apex, measuring 2-3 mm. No residual pneumothorax on the right. Scattered  areas of bronchial wall thickening are seen on the right.   Pleural drainage  catheter tip projects in the region of the major fissure    Bandlike opacity seen in the right middle lobe and right lower lobe. Trace  pleural fluid is seen on the right. Upper Abdomen: Right adrenal gland is normal.  Left adrenal gland is normal.  Mild stool load is seen in colon    Soft Tissues/Bones: Spurring is seen in the spine. .  Soft tissue gas is seen  in the right chest wall. Impression  Large blebs-bullae seen in the apices. Pleural drainage catheter is seen on  the right. No pneumothorax remaining on the right. Bandlike opacities on the right, likely atelectasis. There is trace pleural  fluid on the right    Tiny noncalcified pulmonary nodules    RECOMMENDATIONS:  Fleischner Society guidelines for follow-up and management of incidentally  detected pulmonary nodules:    Multiple Solid Nodules:    Nodule size less than 6 mm  In a low-risk patient, no routine follow-up. In a high-risk patient, optional CT at 12 months. - Low risk patients include individuals with minimal or absent history of  smoking and other known risk factors. - High risk patients include individuals with a history or smoking or known  risk factors. Radiology 2017 http://pubs. rsna.org/doi/full/10.1148/radiol. 9357806743      CTPA: No results found for this or any previous visit. CXR PA/LAT: No results found for this or any previous visit. CXR portable: Results for orders placed during the hospital encounter of 01/26/20    XR CHEST PORTABLE    Narrative  EXAMINATION:  ONE XRAY VIEW OF THE CHEST    1/29/2020 3:40 am    COMPARISON:  CT from 01/28/2020 and prior    HISTORY:  ORDERING SYSTEM PROVIDED HISTORY: chest tube clamped, follow-up on  pneumothorax  TECHNOLOGIST PROVIDED HISTORY:  Reason for exam:->chest tube clamped, follow-up on pneumothorax  Reason for Exam: chest tube clamped, follow-up on pneumothorax    FINDINGS:  Study limited by overlying support and monitoring apparatus.     Heart mediastinal contours are stable. Right-sided pigtail catheter projects at the base of the right hemithorax. Advanced emphysematous changes are noted in the lung apices bilaterally. No  definite pneumothorax on current study. Lungs are grossly clear. Osseous  structures are unremarkable. Impression  Right-sided chest tube in place    No pneumothorax identified. Pulmonary function testing  No previous PFT. Assessment/Plan:       Diagnosis Orders   1. Centrilobular emphysema ([address]  Full PFT Study With Bronchodilator    Alpha-1-Antitrypsin         Problem List Items Addressed This Visit     None      Visit Diagnoses     Centrilobular emphysema ([address]    -  Primary    Relevant Medications    albuterol sulfate HFA (VENTOLIN HFA) 108 (90 Base) MCG/ACT inhaler    Other Relevant Orders    Full PFT Study With Bronchodilator    Alpha-1-Antitrypsin              This note was transcribed using [employer] [employer].. Please disregard any translational errors.     Trinh Lopez MD  [facility] Pulmonary, Sleep and Critical Care

## 2022-02-15 RX ORDER — ALBUTEROL SULFATE 90 UG/1
AEROSOL, METERED RESPIRATORY (INHALATION)
Qty: 25.5 G | Refills: 5 | Status: SHIPPED | OUTPATIENT
Start: 2022-02-15 | End: 2022-09-14 | Stop reason: SDUPTHER

## 2022-02-17 ENCOUNTER — OFFICE VISIT (OUTPATIENT)
Dept: PULMONOLOGY | Age: 47
End: 2022-02-17
Payer: COMMERCIAL

## 2022-02-17 VITALS
BODY MASS INDEX: 31.51 KG/M2 | WEIGHT: 237.8 LBS | TEMPERATURE: 97.6 F | OXYGEN SATURATION: 95 % | SYSTOLIC BLOOD PRESSURE: 118 MMHG | DIASTOLIC BLOOD PRESSURE: 70 MMHG | RESPIRATION RATE: 16 BRPM | HEIGHT: 73 IN | HEART RATE: 100 BPM

## 2022-02-17 DIAGNOSIS — J43.9 BULLOUS EMPHYSEMA (HCC): ICD-10-CM

## 2022-02-17 PROCEDURE — 99214 OFFICE O/P EST MOD 30 MIN: CPT | Performed by: INTERNAL MEDICINE

## 2022-02-17 PROCEDURE — 99406 BEHAV CHNG SMOKING 3-10 MIN: CPT | Performed by: INTERNAL MEDICINE

## 2022-02-17 NOTE — PROGRESS NOTES
Pulmonary Progress note           REASON FOR CONSULTATION:  Chief Complaint   Patient presents with    Follow-up     testing not done due to ins change        Consult at request of Kalpesh Ledesma MD     PCP: Kalpesh Ldeesma MD        Assessment and Plan:  Started on Trelegy daily  Continue albuterol as needed  Advised to quit smoking  Follow up in 6 months         HISTORY OF PRESENT ILLNESS: Beba Renee is very pleasant 55y.o. year old gentleman with medical history stated below significant for active tobacco use with > 30 PY history of smoking currently 1 PPD, h/o secondary spontaneous pneumothorax 1/2020 s/p chest tube drainage,  lung nodules 3 mm right upper lobe, here today for follow-up. He was admitted to [facility] in January 2020 for a right-sided spontaneous pneumothorax. He was found to have bullous emphysema. He required chest tube placement. He complains of shortness of breath with moderate exertion  He  uses albuterol inhaler as needed. 2/17/22:  Here for follow up  Could not do PFT due to cost  C/o sob with exertion  Uses albuterol as needed with improvement  Still smokes. Past Medical History:   Diagnosis Date    Bleb, lung ([address]     Pneumothorax on right 01/26/2020    chest tube placed in ED    Tobacco use     started at age 21       History reviewed. No pertinent surgical history. family history includes Cancer in his father; Other in his mother. Has family history of pneumothorax in his brother. SOCIAL HISTORY:   reports that he has been smoking cigarettes. He started smoking about 27 years ago. He has a 20.00 pack-year smoking history. He has quit using smokeless tobacco.      ALLERGIES:  Patient has No Known Allergies. REVIEW OF SYSTEMS:  Constitutional: Negative for fever   HENT: Negative for sore throat  Eyes: Negative for redness   Respiratory: Shortness of breath with exertion.     Cardiovascular: Negative for chest pain  Gastrointestinal: Negative for vomiting, diarrhea   Genitourinary: Negative for hematuria   Musculoskeletal: Negative for arthralgias   Skin: Negative for rash  Neurological: Negative for syncope  Hematological: Negative for adenopathy  Psychiatric/Behavorial: Negative for anxiety    Objective:   PHYSICAL EXAM:  Blood pressure 118/70, pulse 100, temperature 97.6 °F (36.4 °C), temperature source Infrared, resp. rate 16, height 6' 1\" (1.854 m), weight 237 lb 12.8 oz (107.9 kg), SpO2 95 %.'  Gen: No distress. Eyes: PERRL. No sclera icterus. No conjunctival injection. ENT: No discharge. Pharynx clear. External appearance of ears and nose normal.  Neck: Trachea midline. No obvious mass. Resp: No accessory muscle use. No crackles. No wheezes. No rhonchi. CV: Regular rate. Regular rhythm. No murmur or rub. No edema. GI: Non-tender. Non-distended. No hernia. Skin: Warm, dry, normal texture and turgor. No nodule on exposed extremities. Lymph: No cervical LAD. No supraclavicular LAD. M/S: No cyanosis. No clubbing. No joint deformity. Neuro: Moves all four extremities. Psych: Oriented x 3. No anxiety. Awake. Alert. Intact judgement and insight. Current Outpatient Medications   Medication Sig Dispense Refill    albuterol sulfate  (90 Base) MCG/ACT inhaler INHALE 2 PUFFS INTO THE LUNGS FOUR TIMES DAILY AS NEEDED FOR WHEEZING OR SHORTNESS OF BREATH 25.5 g 5     No current facility-administered medications for this visit.        Data Reviewed:   CBC and Renal reviewed  Last CBC  Lab Results   Component Value Date    WBC 13.9 01/26/2020    RBC 5.62 01/26/2020    HGB 16.6 01/26/2020    MCV 89.7 01/26/2020     01/26/2020     Last Renal  Lab Results   Component Value Date     01/26/2020    K 4.2 01/26/2020    CL 96 01/26/2020    CO2 25 01/26/2020    BUN 9 01/26/2020    CREATININE 0.6 01/26/2020    GLUCOSE 105 01/26/2020    CALCIUM 10.1 01/26/2020       Last ABG  POC Blood Gas: No results found for: POCPH, POCPCO2, POCPO2, POCHCO3, NBEA, WHST9BTL  No results for input(s): PH, PCO2, PO2, HCO3, BE, O2SAT in the last 72 hours. Radiology Review:  Pertinent images / reports were reviewed as a part of this visit. CT Chest w/ contrast: No results found for this or any previous visit. CT Chest w/o contrast: Results for orders placed during the hospital encounter of 01/26/20    CT CHEST WO CONTRAST    Narrative  EXAMINATION:  CT OF THE CHEST WITHOUT CONTRAST 1/28/2020 1:00 pm    TECHNIQUE:  CT of the chest was performed without the administration of intravenous  contrast. Multiplanar reformatted images are provided for review. Dose  modulation, iterative reconstruction, and/or weight based adjustment of the  mA/kV was utilized to reduce the radiation dose to as low as reasonably  achievable. COMPARISON:  None. HISTORY:  ORDERING SYSTEM PROVIDED HISTORY: pneumothorax, unclear cause, for structural  issues  TECHNOLOGIST PROVIDED HISTORY:  Reason for exam:->pneumothorax, unclear cause, for structural issues  Reason for Exam: pneumothorax, unclear cause, for structural issues. no  surgery  Acuity: Acute  Type of Exam: Subsequent/Follow-up    FINDINGS:  Mediastinum: Thyroid gland is unremarkable. Tiny mediastinal nodes are  noted. Coronary artery calcification is seen. Trace pericardial fluid is  seen. Small hiatal hernia seen. There is nonspecific thickening at the GE  junction. Lungs/pleura: Large blebs-bullae seen in the left lung apex. There is a tiny  noncalcified pulmonary nodule anteriorly left lung apex measuring 2-3 mm. No  pleural effusions or airspace consolidation on the left    Small pleural drainage catheter is seen on the right. Bleb-bullae seen in  the right lung apex. Tiny punctate pulmonary nodule seen in the right lung  apex, measuring 2-3 mm. No residual pneumothorax on the right.   Scattered  areas of bronchial wall thickening are seen on the right.  Pleural drainage  catheter tip projects in the region of the major fissure    Bandlike opacity seen in the right middle lobe and right lower lobe. Trace  pleural fluid is seen on the right. Upper Abdomen: Right adrenal gland is normal.  Left adrenal gland is normal.  Mild stool load is seen in colon    Soft Tissues/Bones: Spurring is seen in the spine. .  Soft tissue gas is seen  in the right chest wall. Impression  Large blebs-bullae seen in the apices. Pleural drainage catheter is seen on  the right. No pneumothorax remaining on the right. Bandlike opacities on the right, likely atelectasis. There is trace pleural  fluid on the right    Tiny noncalcified pulmonary nodules    RECOMMENDATIONS:  Fleischner Society guidelines for follow-up and management of incidentally  detected pulmonary nodules:    Multiple Solid Nodules:    Nodule size less than 6 mm  In a low-risk patient, no routine follow-up. In a high-risk patient, optional CT at 12 months. - Low risk patients include individuals with minimal or absent history of  smoking and other known risk factors. - High risk patients include individuals with a history or smoking or known  risk factors. Radiology 2017 http://pubs. rsna.org/doi/full/10.1148/radiol. 1209548714      CTPA: No results found for this or any previous visit. CXR PA/LAT: No results found for this or any previous visit.       CXR portable: Results for orders placed during the hospital encounter of 01/26/20    XR CHEST PORTABLE    Narrative  EXAMINATION:  ONE XRAY VIEW OF THE CHEST    1/29/2020 3:40 am    COMPARISON:  CT from 01/28/2020 and prior    HISTORY:  ORDERING SYSTEM PROVIDED HISTORY: chest tube clamped, follow-up on  pneumothorax  TECHNOLOGIST PROVIDED HISTORY:  Reason for exam:->chest tube clamped, follow-up on pneumothorax  Reason for Exam: chest tube clamped, follow-up on pneumothorax    FINDINGS:  Study limited by overlying support and monitoring apparatus. Heart mediastinal contours are stable. Right-sided pigtail catheter projects at the base of the right hemithorax. Advanced emphysematous changes are noted in the lung apices bilaterally. No  definite pneumothorax on current study. Lungs are grossly clear. Osseous  structures are unremarkable. Impression  Right-sided chest tube in place    No pneumothorax identified. Pulmonary function testing  No previous PFT. Assessment/Plan:       Diagnosis Orders   1. Bullous emphysema ([address]           Problem List Items Addressed This Visit     [address]ullous emphysema ([facility] [facility]              This note was transcribed using [email or web address]. Please disregard any translational errors.     Omar Anderson MD  [facility] Pulmonary, Sleep and Critical Care

## 2022-02-17 NOTE — PATIENT INSTRUCTIONS
Started new inhaler  Continue albuterol as needed  Advised about smoking cessation  Follow up in 6 months

## 2022-02-22 ENCOUNTER — TELEPHONE (OUTPATIENT)
Dept: PULMONOLOGY | Age: 47
End: 2022-02-22

## 2022-02-22 DIAGNOSIS — J41.0 SIMPLE CHRONIC BRONCHITIS (HCC): Primary | ICD-10-CM

## 2022-02-22 RX ORDER — FLUTICASONE FUROATE, UMECLIDINIUM BROMIDE AND VILANTEROL TRIFENATATE 200; 62.5; 25 UG/1; UG/1; UG/1
1 POWDER RESPIRATORY (INHALATION) DAILY
Qty: 1 EACH | Refills: 5 | Status: SHIPPED | OUTPATIENT
Start: 2022-02-22 | End: 2022-10-12

## 2022-09-14 ENCOUNTER — OFFICE VISIT (OUTPATIENT)
Dept: PULMONOLOGY | Age: 47
End: 2022-09-14
Payer: COMMERCIAL

## 2022-09-14 VITALS
DIASTOLIC BLOOD PRESSURE: 80 MMHG | RESPIRATION RATE: 18 BRPM | OXYGEN SATURATION: 100 % | HEIGHT: 73 IN | HEART RATE: 72 BPM | TEMPERATURE: 96.9 F | SYSTOLIC BLOOD PRESSURE: 140 MMHG | BODY MASS INDEX: 29.42 KG/M2 | WEIGHT: 222 LBS

## 2022-09-14 DIAGNOSIS — J43.2 CENTRILOBULAR EMPHYSEMA (HCC): Primary | ICD-10-CM

## 2022-09-14 PROCEDURE — 99214 OFFICE O/P EST MOD 30 MIN: CPT | Performed by: INTERNAL MEDICINE

## 2022-09-14 PROCEDURE — 99406 BEHAV CHNG SMOKING 3-10 MIN: CPT | Performed by: INTERNAL MEDICINE

## 2022-09-14 RX ORDER — ALBUTEROL SULFATE 90 UG/1
2 AEROSOL, METERED RESPIRATORY (INHALATION) EVERY 6 HOURS PRN
Qty: 3 EACH | Refills: 3 | Status: SHIPPED | OUTPATIENT
Start: 2022-09-14

## 2022-09-14 RX ORDER — IPRATROPIUM BROMIDE AND ALBUTEROL SULFATE 2.5; .5 MG/3ML; MG/3ML
1 SOLUTION RESPIRATORY (INHALATION) EVERY 4 HOURS
Qty: 360 ML | Refills: 11 | Status: SHIPPED | OUTPATIENT
Start: 2022-09-14

## 2022-09-14 NOTE — PROGRESS NOTES
Pulmonary Progress note           REASON FOR CONSULTATION:  Chief Complaint   Patient presents with    Follow-up          Consult at request of Jesusita Crisostomo MD     PCP: Jesusita Crisostomo MD        Assessment and Plan:  Continue Trelegy daily  Continue albuterol as needed  Advised to quit smoking > 3 minutes of counseling was given  Follow up in 6 months   Will obtain CBC with differential as well as allergy testing, suspect allergic/asthma component given his persistent symptoms and seasonal variation, patient could not perform PFT due to high cost.  Will consider adding therapy if having significant eosinophilic/allergy component        HISTORY OF PRESENT ILLNESS: Clifford Cano is very pleasant 52y.o. year old gentleman with medical history stated below significant for active tobacco use with > 30 PY history of smoking currently 1 PPD, h/o secondary spontaneous pneumothorax 1/2020 s/p chest tube drainage,  lung nodules 3 mm right upper lobe, here today for follow-up. He was admitted to [facility] in January 2020 for a right-sided spontaneous pneumothorax. He was found to have bullous emphysema. He required chest tube placement. He complains of shortness of breath with moderate exertion  He  uses albuterol inhaler as needed. 09/14/22:  Here for follow up  Could not do PFT due to cost  Compliant with inhalers, albuterol use daily twice per day   Still smokes. Past Medical History:   Diagnosis Date    Bleb, lung ([address]     Pneumothorax on right 01/26/2020    chest tube placed in ED    Tobacco use     started at age 21       No past surgical history on file. family history includes Cancer in his father; Other in his mother. Has family history of pneumothorax in his brother. SOCIAL HISTORY:   reports that he has been smoking cigarettes. He started smoking about 27 years ago. He has a 20.00 pack-year smoking history.  He has been exposed to tobacco smoke. He quit smokeless tobacco use about 8 months ago. His smokeless tobacco use included chew. ALLERGIES:  Patient has no allergies on file. REVIEW OF SYSTEMS:  Constitutional: Negative for fever   HENT: Negative for sore throat  Eyes: Negative for redness   Respiratory: Shortness of breath with exertion. Cardiovascular: Negative for chest pain  Gastrointestinal: Negative for vomiting, diarrhea   Genitourinary: Negative for hematuria   Musculoskeletal: Negative for arthralgias   Skin: Negative for rash  Neurological: Negative for syncope  Hematological: Negative for adenopathy  Psychiatric/Behavorial: Negative for anxiety    Objective:   PHYSICAL EXAM:  Blood pressure (!) 140/80, pulse 72, temperature 96.9 °F (36.1 °C), resp. rate 18, height 6' 1\" (1.854 m), weight 222 lb (100.7 kg), SpO2 100 %.'  Gen: No distress. Eyes: PERRL. No sclera icterus. No conjunctival injection. ENT: No discharge. Pharynx clear. External appearance of ears and nose normal.  Neck: Trachea midline. No obvious mass. Resp: No accessory muscle use. No crackles. No wheezes. No rhonchi. CV: Regular rate. Regular rhythm. No murmur or rub. No edema. GI: Non-tender. Non-distended. No hernia. Skin: Warm, dry, normal texture and turgor. No nodule on exposed extremities. Lymph: No cervical LAD. No supraclavicular LAD. M/S: No cyanosis. No clubbing. No joint deformity. Neuro: Moves all four extremities. Psych: Oriented x 3. No anxiety. Awake. Alert. Intact judgement and insight. Current Outpatient Medications   Medication Sig Dispense Refill    Fluticasone-Umeclidin-Vilant (TRELEGY ELLIPTA) 200-62.5-25 MCG/INH AEPB Inhale 1 puff into the lungs daily 1 each 5    albuterol sulfate  (90 Base) MCG/ACT inhaler INHALE 2 PUFFS INTO THE LUNGS FOUR TIMES DAILY AS NEEDED FOR WHEEZING OR SHORTNESS OF BREATH 25.5 g 5     No current facility-administered medications for this visit.        Data Reviewed:   CBC and Renal reviewed  Last CBC  Lab Results   Component Value Date/Time    WBC 13.9 01/26/2020 01:45 PM    RBC 5.62 01/26/2020 01:45 PM    HGB 16.6 01/26/2020 01:45 PM    MCV 89.7 01/26/2020 01:45 PM     01/26/2020 01:45 PM     Last Renal  Lab Results   Component Value Date/Time     01/26/2020 01:45 PM    K 4.2 01/26/2020 01:45 PM    CL 96 01/26/2020 01:45 PM    CO2 25 01/26/2020 01:45 PM    BUN 9 01/26/2020 01:45 PM    CREATININE 0.6 01/26/2020 01:45 PM    GLUCOSE 105 01/26/2020 01:45 PM    CALCIUM 10.1 01/26/2020 01:45 PM       Last ABG  POC Blood Gas: No results found for: POCPH, POCPCO2, POCPO2, POCHCO3, NBEA, YPDW1WBF  No results for input(s): PH, PCO2, PO2, HCO3, BE, O2SAT in the last 72 hours. Radiology Review:  Pertinent images / reports were reviewed as a part of this visit. CT Chest w/ contrast: No results found for this or any previous visit. CT Chest w/o contrast: Results for orders placed during the hospital encounter of 01/26/20    CT CHEST WO CONTRAST    Narrative  EXAMINATION:  CT OF THE CHEST WITHOUT CONTRAST 1/28/2020 1:00 pm    TECHNIQUE:  CT of the chest was performed without the administration of intravenous  contrast. Multiplanar reformatted images are provided for review. Dose  modulation, iterative reconstruction, and/or weight based adjustment of the  mA/kV was utilized to reduce the radiation dose to as low as reasonably  achievable. COMPARISON:  None. HISTORY:  ORDERING SYSTEM PROVIDED HISTORY: pneumothorax, unclear cause, for structural  issues  TECHNOLOGIST PROVIDED HISTORY:  Reason for exam:->pneumothorax, unclear cause, for structural issues  Reason for Exam: pneumothorax, unclear cause, for structural issues. no  surgery  Acuity: Acute  Type of Exam: Subsequent/Follow-up    FINDINGS:  Mediastinum: Thyroid gland is unremarkable. Tiny mediastinal nodes are  noted. Coronary artery calcification is seen. Trace pericardial fluid is  seen.   Small hiatal hernia seen.  There is nonspecific thickening at the GE  junction. Lungs/pleura: Large blebs-bullae seen in the left lung apex. There is a tiny  noncalcified pulmonary nodule anteriorly left lung apex measuring 2-3 mm. No  pleural effusions or airspace consolidation on the left    Small pleural drainage catheter is seen on the right. Bleb-bullae seen in  the right lung apex. Tiny punctate pulmonary nodule seen in the right lung  apex, measuring 2-3 mm. No residual pneumothorax on the right. Scattered  areas of bronchial wall thickening are seen on the right. Pleural drainage  catheter tip projects in the region of the major fissure    Bandlike opacity seen in the right middle lobe and right lower lobe. Trace  pleural fluid is seen on the right. Upper Abdomen: Right adrenal gland is normal.  Left adrenal gland is normal.  Mild stool load is seen in colon    Soft Tissues/Bones: Spurring is seen in the spine. .  Soft tissue gas is seen  in the right chest wall. Impression  Large blebs-bullae seen in the apices. Pleural drainage catheter is seen on  the right. No pneumothorax remaining on the right. Bandlike opacities on the right, likely atelectasis. There is trace pleural  fluid on the right    Tiny noncalcified pulmonary nodules    RECOMMENDATIONS:  Fleischner Society guidelines for follow-up and management of incidentally  detected pulmonary nodules:    Multiple Solid Nodules:    Nodule size less than 6 mm  In a low-risk patient, no routine follow-up. In a high-risk patient, optional CT at 12 months. - Low risk patients include individuals with minimal or absent history of  smoking and other known risk factors. - High risk patients include individuals with a history or smoking or known  risk factors. Radiology 2017 http://pubs. rsna.org/doi/full/10.1148/radiol. 8249078160      CTPA: No results found for this or any previous visit.       CXR PA/LAT: No results found for this or any previous visit.      CXR portable: Results for orders placed during the hospital encounter of 01/26/20    XR CHEST PORTABLE    Narrative  EXAMINATION:  ONE XRAY VIEW OF THE CHEST    1/29/2020 3:40 am    COMPARISON:  CT from 01/28/2020 and prior    HISTORY:  ORDERING SYSTEM PROVIDED HISTORY: chest tube clamped, follow-up on  pneumothorax  TECHNOLOGIST PROVIDED HISTORY:  Reason for exam:->chest tube clamped, follow-up on pneumothorax  Reason for Exam: chest tube clamped, follow-up on pneumothorax    FINDINGS:  Study limited by overlying support and monitoring apparatus. Heart mediastinal contours are stable. Right-sided pigtail catheter projects at the base of the right hemithorax. Advanced emphysematous changes are noted in the lung apices bilaterally. No  definite pneumothorax on current study. Lungs are grossly clear. Osseous  structures are unremarkable. Impression  Right-sided chest tube in place    No pneumothorax identified. Pulmonary function testing  No previous PFT. Assessment/Plan:       Diagnosis Orders   1. Centrilobular emphysema (HCC)  Allergen, Respiratory, Region 5 Panel    CBC with Auto Differential              Problem List Items Addressed This Visit    None  Visit Diagnoses       Centrilobular emphysema ([address]    -  Primary    Relevant Orders    Allergen, Respiratory, Region 5 Panel    CBC with Auto Differential                  This note was transcribed using [id number] [employer]. Please disregard any translational errors.     Vale Benavides MD  [facility]

## 2022-10-12 DIAGNOSIS — J41.0 SIMPLE CHRONIC BRONCHITIS (HCC): ICD-10-CM

## 2022-10-12 RX ORDER — FLUTICASONE FUROATE, UMECLIDINIUM BROMIDE AND VILANTEROL TRIFENATATE 200; 62.5; 25 UG/1; UG/1; UG/1
1 POWDER RESPIRATORY (INHALATION) DAILY
Qty: 1 EACH | Refills: 5 | Status: SHIPPED | OUTPATIENT
Start: 2022-10-12

## 2023-05-04 RX ORDER — FLUTICASONE FUROATE, UMECLIDINIUM BROMIDE AND VILANTEROL TRIFENATATE 200; 62.5; 25 UG/1; UG/1; UG/1
1 POWDER RESPIRATORY (INHALATION) DAILY
Qty: 1 EACH | Refills: 2 | Status: SHIPPED | OUTPATIENT
Start: 2023-05-04

## 2023-06-03 DIAGNOSIS — J41.0 SIMPLE CHRONIC BRONCHITIS (HCC): ICD-10-CM

## 2023-06-05 RX ORDER — FLUTICASONE FUROATE, UMECLIDINIUM BROMIDE AND VILANTEROL TRIFENATATE 200; 62.5; 25 UG/1; UG/1; UG/1
POWDER RESPIRATORY (INHALATION)
Qty: 60 EACH | Refills: 5 | Status: SHIPPED | OUTPATIENT
Start: 2023-06-05

## 2023-07-07 ENCOUNTER — OFFICE VISIT (OUTPATIENT)
Dept: PULMONOLOGY | Age: 48
End: 2023-07-07
Payer: COMMERCIAL

## 2023-07-07 VITALS
HEIGHT: 73 IN | DIASTOLIC BLOOD PRESSURE: 70 MMHG | TEMPERATURE: 96.9 F | HEART RATE: 80 BPM | BODY MASS INDEX: 33.64 KG/M2 | SYSTOLIC BLOOD PRESSURE: 120 MMHG | OXYGEN SATURATION: 91 % | WEIGHT: 253.8 LBS | RESPIRATION RATE: 18 BRPM

## 2023-07-07 DIAGNOSIS — J93.12 SECONDARY SPONTANEOUS PNEUMOTHORAX: ICD-10-CM

## 2023-07-07 DIAGNOSIS — J43.9 BULLOUS EMPHYSEMA (HCC): ICD-10-CM

## 2023-07-07 DIAGNOSIS — J96.11 CHRONIC RESPIRATORY FAILURE WITH HYPOXIA (HCC): Primary | ICD-10-CM

## 2023-07-07 PROCEDURE — 99213 OFFICE O/P EST LOW 20 MIN: CPT | Performed by: INTERNAL MEDICINE

## 2023-07-14 RX ORDER — ALBUTEROL SULFATE 90 UG/1
2 AEROSOL, METERED RESPIRATORY (INHALATION) EVERY 6 HOURS PRN
Qty: 1 EACH | Refills: 5 | Status: SHIPPED | OUTPATIENT
Start: 2023-07-14

## 2023-12-06 NOTE — PROGRESS NOTES
Pulmonary Progress note           REASON FOR CONSULTATION:  Chief Complaint   Patient presents with    Follow-up    Shortness of Breath    COPD          Consult at request of Syeda Santana     PCP: Syeda Santana        Assessment and Plan:  Severe copd   Duoneb QID as needed   Continue Trelegy daily  suspect allergic/asthma component given his persistent symptoms and seasonal variation, patient could not perform PFT due to high cost.          HISTORY OF PRESENT ILLNESS: Silas Riedel is very pleasant 50y.o. year old gentleman with medical history stated below significant for active tobacco use with > 30 PY history of smoking currently 1 PPD, h/o secondary spontaneous pneumothorax 1/2020 s/p chest tube drainage,  lung nodules 3 mm right upper lobe, here today for follow-up. He was admitted to [facility] in January 2020 for a right-sided spontaneous pneumothorax. He was found to have bullous emphysema. He required chest tube placement. He complains of shortness of breath with moderate exertion  He  uses albuterol inhaler as needed. 7/7/23:  Recently treated for severe exacerbation  Doing better  Compliant with inhalers  Using oxygen as needed        Past Medical History:   Diagnosis Date    Bleb, lung ([address])     Pneumothorax on right 01/26/2020    chest tube placed in ED    Tobacco use     started at age 21       No past surgical history on file. family history includes Cancer in his father; Other in his mother. Has family history of pneumothorax in his brother. SOCIAL HISTORY:   reports that he quit smoking about 2 weeks ago. His smoking use included cigarettes. He started smoking about 28 years ago. He has a 20.00 pack-year smoking history. He has been exposed to tobacco smoke. He quit smokeless tobacco use about 18 months ago. His smokeless tobacco use included chew. ALLERGIES:  Patient has No Known Allergies.     REVIEW OF Anthony Solano

## 2024-01-10 ENCOUNTER — OFFICE VISIT (OUTPATIENT)
Dept: PULMONOLOGY | Age: 49
End: 2024-01-10
Payer: COMMERCIAL

## 2024-01-10 VITALS
BODY MASS INDEX: 33.35 KG/M2 | SYSTOLIC BLOOD PRESSURE: 140 MMHG | OXYGEN SATURATION: 94 % | RESPIRATION RATE: 18 BRPM | HEIGHT: 72 IN | TEMPERATURE: 96.6 F | WEIGHT: 246.2 LBS | DIASTOLIC BLOOD PRESSURE: 72 MMHG | HEART RATE: 81 BPM

## 2024-01-10 DIAGNOSIS — J43.2 CENTRILOBULAR EMPHYSEMA (HCC): Primary | ICD-10-CM

## 2024-01-10 PROCEDURE — 99213 OFFICE O/P EST LOW 20 MIN: CPT | Performed by: INTERNAL MEDICINE

## 2024-01-11 NOTE — PROGRESS NOTES
Pulmonary Progress note           REASON FOR CONSULTATION:  Chief Complaint   Patient presents with    Follow-up     Emphysema          Consult at request of Kaylin Stevens MD     PCP: Kaylin Stevens MD        Assessment and Plan:  Severe copd   Duoneb QID as needed   Continue Trelegy daily  suspect allergic/asthma component given his persistent symptoms and seasonal variation, patient could not perform PFT due to high cost.          HISTORY OF PRESENT ILLNESS: Juan Kaur is very pleasant 48 y.o. year old gentleman with medical history stated below significant for active tobacco use with > 30 PY history of smoking currently 1 PPD, h/o secondary spontaneous pneumothorax 1/2020 s/p chest tube drainage,  lung nodules 3 mm right upper lobe, here today for follow-up.    He was admitted to [facility] in January 2020 for a right-sided spontaneous pneumothorax.  He was found to have bullous emphysema. He required chest tube placement.  He complains of shortness of breath with moderate exertion  He  uses albuterol inhaler as needed.      1/10/2024    Compliant with inhalers  Using oxygen as needed        Past Medical History:   Diagnosis Date    Bleb, lung (HCC)     Pneumothorax on right 01/26/2020    chest tube placed in ED    Tobacco use     started at age 20       History reviewed. No pertinent surgical history.    family history includes Cancer in his father; Other in his mother.  Has family history of pneumothorax in his brother.    SOCIAL HISTORY:   reports that he quit smoking about 6 months ago. His smoking use included cigarettes. He started smoking about 29 years ago. He has a 28.5 pack-year smoking history. He has been exposed to tobacco smoke. He quit smokeless tobacco use about 2 years ago.  His smokeless tobacco use included chew.      ALLERGIES:  Patient has No Known Allergies.    REVIEW OF SYSTEMS:  Constitutional: Negative for fever   HENT: Negative for

## 2024-02-07 RX ORDER — FLUTICASONE FUROATE, UMECLIDINIUM BROMIDE AND VILANTEROL TRIFENATATE 200; 62.5; 25 UG/1; UG/1; UG/1
1 POWDER RESPIRATORY (INHALATION) DAILY
Qty: 1 EACH | Refills: 5 | Status: SHIPPED | OUTPATIENT
Start: 2024-02-07

## 2024-07-23 ENCOUNTER — OFFICE VISIT (OUTPATIENT)
Dept: PULMONOLOGY | Age: 49
End: 2024-07-23
Payer: COMMERCIAL

## 2024-07-23 VITALS
RESPIRATION RATE: 18 BRPM | HEIGHT: 72 IN | TEMPERATURE: 96.8 F | OXYGEN SATURATION: 95 % | HEART RATE: 77 BPM | SYSTOLIC BLOOD PRESSURE: 126 MMHG | BODY MASS INDEX: 32.72 KG/M2 | DIASTOLIC BLOOD PRESSURE: 70 MMHG | WEIGHT: 241.6 LBS

## 2024-07-23 DIAGNOSIS — J43.9 BULLOUS EMPHYSEMA (HCC): Primary | ICD-10-CM

## 2024-07-23 DIAGNOSIS — J41.0 SIMPLE CHRONIC BRONCHITIS (HCC): ICD-10-CM

## 2024-07-23 DIAGNOSIS — J93.12 SECONDARY SPONTANEOUS PNEUMOTHORAX: ICD-10-CM

## 2024-07-23 PROCEDURE — 99213 OFFICE O/P EST LOW 20 MIN: CPT | Performed by: INTERNAL MEDICINE

## 2024-07-23 RX ORDER — IPRATROPIUM BROMIDE AND ALBUTEROL SULFATE 2.5; .5 MG/3ML; MG/3ML
1 SOLUTION RESPIRATORY (INHALATION) EVERY 4 HOURS
Qty: 360 ML | Refills: 11 | Status: SHIPPED | OUTPATIENT
Start: 2024-07-23

## 2024-07-23 RX ORDER — FLUTICASONE FUROATE, UMECLIDINIUM BROMIDE AND VILANTEROL TRIFENATATE 200; 62.5; 25 UG/1; UG/1; UG/1
1 POWDER RESPIRATORY (INHALATION) DAILY
Qty: 1 EACH | Refills: 5 | Status: SHIPPED | OUTPATIENT
Start: 2024-07-23

## 2024-07-23 RX ORDER — ALBUTEROL SULFATE 90 UG/1
2 AEROSOL, METERED RESPIRATORY (INHALATION) EVERY 6 HOURS PRN
Qty: 1 EACH | Refills: 5 | Status: SHIPPED | OUTPATIENT
Start: 2024-07-23

## 2024-07-23 NOTE — PROGRESS NOTES
Pulmonary Progress note           REASON FOR CONSULTATION:  Chief Complaint   Patient presents with    Follow-up     emphysema          Consult at request of Kaylin Stevens MD     PCP: Kaylin Stevens MD        Assessment and Plan:  Severe copd   Duoneb QID as needed   Continue Trelegy daily  suspect allergic/asthma component given his persistent symptoms and seasonal variation, patient could not perform PFT due to high cost.          HISTORY OF PRESENT ILLNESS: Juan Kaur is very pleasant 49 y.o. year old gentleman with medical history stated below significant for active tobacco use with > 30 PY history of smoking currently 1 PPD, h/o secondary spontaneous pneumothorax 1/2020 s/p chest tube drainage,  lung nodules 3 mm right upper lobe, here today for follow-up.    He was admitted to [facility] in January 2020 for a right-sided spontaneous pneumothorax.  He was found to have bullous emphysema. He required chest tube placement.  He complains of shortness of breath with moderate exertion  He  uses albuterol inhaler as needed.      Compliant with inhalers  Using oxygen as needed        Past Medical History:   Diagnosis Date    Bleb, lung (HCC)     Pneumothorax on right 01/26/2020    chest tube placed in ED    Tobacco use     started at age 20       History reviewed. No pertinent surgical history.    family history includes Cancer in his father; Other in his mother.  Has family history of pneumothorax in his brother.    SOCIAL HISTORY:   reports that he quit smoking about 13 months ago. His smoking use included cigarettes. He started smoking about 29 years ago. He has a 28.5 pack-year smoking history. He has been exposed to tobacco smoke. He quit smokeless tobacco use about 2 years ago.  His smokeless tobacco use included chew.      ALLERGIES:  Patient has No Known Allergies.    REVIEW OF SYSTEMS:  Constitutional: Negative for fever   HENT: Negative for sore

## 2025-01-23 ENCOUNTER — OFFICE VISIT (OUTPATIENT)
Dept: PULMONOLOGY | Age: 50
End: 2025-01-23

## 2025-01-23 VITALS
DIASTOLIC BLOOD PRESSURE: 72 MMHG | RESPIRATION RATE: 18 BRPM | HEIGHT: 72 IN | BODY MASS INDEX: 30.61 KG/M2 | TEMPERATURE: 97.7 F | SYSTOLIC BLOOD PRESSURE: 130 MMHG | WEIGHT: 226 LBS | HEART RATE: 75 BPM | OXYGEN SATURATION: 93 %

## 2025-01-23 DIAGNOSIS — J96.11 CHRONIC RESPIRATORY FAILURE WITH HYPOXIA: ICD-10-CM

## 2025-01-23 DIAGNOSIS — J43.2 CENTRILOBULAR EMPHYSEMA (HCC): Primary | ICD-10-CM

## 2025-01-23 NOTE — PROGRESS NOTES
Pulmonary Progress note           REASON FOR CONSULTATION:  Chief Complaint   Patient presents with    Follow-up     Emphysema          Consult at request of Kaylin Stevens MD     PCP: Kaylin Stevens MD        Assessment and Plan:  Severe copd   Duoneb QID as needed   Continue Trelegy daily  suspect allergic/asthma component given his persistent symptoms and seasonal variation, patient could not perform PFT due to high cost.          HISTORY OF PRESENT ILLNESS: Juan Kaur is very pleasant 49 y.o. year old gentleman with medical history stated below significant for active tobacco use with > 30 PY history of smoking currently 1 PPD, h/o secondary spontaneous pneumothorax 1/2020 s/p chest tube drainage,  lung nodules 3 mm right upper lobe, here today for follow-up.    He was admitted to [facility] in January 2020 for a right-sided spontaneous pneumothorax.  He was found to have bullous emphysema. He required chest tube placement.  He complains of shortness of breath with moderate exertion  He  uses albuterol inhaler as needed.      Compliant with inhalers  Using oxygen as needed  Complaining of left hip pain and thinking that he would require surgical procedure has not met with Ortho doctor yet      Past Medical History:   Diagnosis Date    Bleb, lung (HCC)     Pneumothorax on right 01/26/2020    chest tube placed in ED    Tobacco use     started at age 20       History reviewed. No pertinent surgical history.    family history includes Cancer in his father; Other in his mother.  Has family history of pneumothorax in his brother.    SOCIAL HISTORY:   reports that he quit smoking about 19 months ago. His smoking use included cigarettes. He started smoking about 30 years ago. He has a 28.5 pack-year smoking history. He has been exposed to tobacco smoke. He quit smokeless tobacco use about 3 years ago.  His smokeless tobacco use included

## 2025-03-04 ENCOUNTER — TELEPHONE (OUTPATIENT)
Dept: PULMONOLOGY | Age: 50
End: 2025-03-04

## 2025-03-04 NOTE — TELEPHONE ENCOUNTER
[facility] called and said they need the last OV note and certificate of medical necesity sent to [phone]

## 2025-03-17 ENCOUNTER — TELEPHONE (OUTPATIENT)
Dept: PULMONOLOGY | Age: 50
End: 2025-03-17

## 2025-03-17 NOTE — TELEPHONE ENCOUNTER
[facility] is a respiratory therapist calling in asking for Shelby's oxygen order and clinical notes to be faxed to her at [phone]  She works for his insurance company.

## 2025-05-01 RX ORDER — ALBUTEROL SULFATE 90 UG/1
2 INHALANT RESPIRATORY (INHALATION) EVERY 6 HOURS PRN
Qty: 8.5 G | Refills: 5 | Status: SHIPPED | OUTPATIENT
Start: 2025-05-01

## 2025-07-15 ENCOUNTER — OFFICE VISIT (OUTPATIENT)
Dept: PULMONOLOGY | Age: 50
End: 2025-07-15
Payer: COMMERCIAL

## 2025-07-15 VITALS
RESPIRATION RATE: 18 BRPM | HEIGHT: 72 IN | DIASTOLIC BLOOD PRESSURE: 80 MMHG | TEMPERATURE: 98 F | SYSTOLIC BLOOD PRESSURE: 120 MMHG | WEIGHT: 220 LBS | OXYGEN SATURATION: 96 % | BODY MASS INDEX: 29.8 KG/M2 | HEART RATE: 76 BPM

## 2025-07-15 DIAGNOSIS — J96.11 CHRONIC RESPIRATORY FAILURE WITH HYPOXIA (HCC): Primary | ICD-10-CM

## 2025-07-15 DIAGNOSIS — J41.0 SIMPLE CHRONIC BRONCHITIS (HCC): ICD-10-CM

## 2025-07-15 DIAGNOSIS — J93.12 SECONDARY SPONTANEOUS PNEUMOTHORAX: ICD-10-CM

## 2025-07-15 PROCEDURE — G2211 COMPLEX E/M VISIT ADD ON: HCPCS | Performed by: INTERNAL MEDICINE

## 2025-07-15 PROCEDURE — 99214 OFFICE O/P EST MOD 30 MIN: CPT | Performed by: INTERNAL MEDICINE

## 2025-07-15 NOTE — PATIENT INSTRUCTIONS
Cleared from pulmonary stand point   Continue current treatment  Advised about using incentive spirometry and early ambulation  6 months follow up    Principal Discharge DX:	Finger laceration   1

## 2025-07-15 NOTE — PROGRESS NOTES
Pulmonary Progress note           REASON FOR CONSULTATION:  Chief Complaint   Patient presents with    Follow-up          Consult at request of Kaylin Stevens MD     PCP: Kaylin Stevens MD        Assessment and Plan:  Patient is cleared from pulmonary standpoint for left hip surgery, he was advised about using incentive spirometry and early ambulation, no further workup is needed  Duoneb QID as needed   Continue Trelegy daily            HISTORY OF PRESENT ILLNESS: Juan Kaur is very pleasant 50 y.o. year old gentleman with medical history stated below significant for active tobacco use with > 30 PY history of smoking currently 1 PPD, h/o secondary spontaneous pneumothorax 1/2020 s/p chest tube drainage,  lung nodules 3 mm right upper lobe, here today for follow-up.    He was admitted to [facility] in January 2020 for a right-sided spontaneous pneumothorax.  He was found to have bullous emphysema. He required chest tube placement.  He complains of shortness of breath with moderate exertion  He  uses albuterol inhaler as needed.      Compliant with inhalers  Oxygen using as needed has not needed it lately.  No recent flareups.  He is scheduled to get left hip replacement soon.        Past Medical History:   Diagnosis Date    Bleb, lung (HCC)     Pneumothorax on right 01/26/2020    chest tube placed in ED    Tobacco use     started at age 20       History reviewed. No pertinent surgical history.    family history includes Cancer in his father; Other in his mother.  Has family history of pneumothorax in his brother.    SOCIAL HISTORY:   reports that he quit smoking about 2 years ago. His smoking use included cigarettes. He started smoking about 30 years ago. He has a 28.5 pack-year smoking history. He has been exposed to tobacco smoke. He quit smokeless tobacco use about 3 years ago.  His smokeless tobacco use included chew.      ALLERGIES:  Patient has no known